# Patient Record
Sex: MALE | Race: WHITE | NOT HISPANIC OR LATINO | Employment: FULL TIME | URBAN - METROPOLITAN AREA
[De-identification: names, ages, dates, MRNs, and addresses within clinical notes are randomized per-mention and may not be internally consistent; named-entity substitution may affect disease eponyms.]

---

## 2018-07-05 ENCOUNTER — OFFICE VISIT (OUTPATIENT)
Dept: INTERNAL MEDICINE | Facility: CLINIC | Age: 28
End: 2018-07-05
Payer: COMMERCIAL

## 2018-07-05 VITALS
WEIGHT: 156.5 LBS | TEMPERATURE: 98 F | SYSTOLIC BLOOD PRESSURE: 110 MMHG | HEART RATE: 68 BPM | HEIGHT: 67 IN | OXYGEN SATURATION: 98 % | DIASTOLIC BLOOD PRESSURE: 62 MMHG | BODY MASS INDEX: 24.56 KG/M2

## 2018-07-05 DIAGNOSIS — M62.838 TRAPEZIUS MUSCLE SPASM: Primary | ICD-10-CM

## 2018-07-05 PROCEDURE — 99213 OFFICE O/P EST LOW 20 MIN: CPT | Mod: S$GLB,,, | Performed by: INTERNAL MEDICINE

## 2018-07-05 PROCEDURE — 99999 PR PBB SHADOW E&M-EST. PATIENT-LVL III: CPT | Mod: PBBFAC,,, | Performed by: INTERNAL MEDICINE

## 2018-07-05 RX ORDER — CYCLOBENZAPRINE HCL 10 MG
10 TABLET ORAL NIGHTLY
Qty: 30 TABLET | Refills: 1 | Status: SHIPPED | OUTPATIENT
Start: 2018-07-05 | End: 2018-08-04

## 2018-07-05 RX ORDER — TRAMADOL HYDROCHLORIDE 50 MG/1
50 TABLET ORAL EVERY 6 HOURS PRN
Qty: 30 TABLET | Refills: 0 | Status: SHIPPED | OUTPATIENT
Start: 2018-07-05 | End: 2018-07-15

## 2018-07-05 NOTE — PROGRESS NOTES
Subjective:       Patient ID: Dennis Franklin is a 28 y.o. male.    Chief Complaint: Neck Pain (upper back pain)    Had pain in right neck and upper back after his usual neck stretching maneuvers this am      Neck Pain    This is a new problem. The current episode started today. The problem occurs constantly. The problem has been unchanged. The pain is associated with nothing. The pain is present in the right side. The quality of the pain is described as aching and cramping. The pain is at a severity of 4/10. The pain is moderate. Pertinent negatives include no chest pain, fever, headaches, leg pain, numbness, pain with swallowing, paresis, photophobia, syncope, tingling, trouble swallowing, visual change, weakness or weight loss. He has tried heat and NSAIDs for the symptoms. The treatment provided mild relief.     Review of Systems   Constitutional: Negative for activity change, appetite change, fever and weight loss.   HENT: Negative for congestion, postnasal drip, sore throat and trouble swallowing.    Eyes: Negative for photophobia.   Respiratory: Negative for cough, shortness of breath and wheezing.    Cardiovascular: Negative for chest pain, palpitations and syncope.   Gastrointestinal: Negative for abdominal pain, blood in stool, constipation, diarrhea, nausea and vomiting.   Genitourinary: Negative for decreased urine volume, difficulty urinating, flank pain and frequency.   Musculoskeletal: Positive for neck pain. Negative for arthralgias.   Neurological: Negative for dizziness, tingling, weakness, numbness and headaches.       Objective:      Physical Exam   Musculoskeletal:        Arms:      Assessment:       1. Trapezius muscle spasm        Plan:   Dennis was seen today for neck pain.    Diagnoses and all orders for this visit:    Trapezius muscle spasm    Other orders  -     cyclobenzaprine (FLEXERIL) 10 MG tablet; Take 1 tablet (10 mg total) by mouth every evening.  -     traMADol (ULTRAM) 50 mg tablet; Take  1 tablet (50 mg total) by mouth every 6 (six) hours as needed for Pain.

## 2018-10-17 ENCOUNTER — LAB VISIT (OUTPATIENT)
Dept: LAB | Facility: HOSPITAL | Age: 28
End: 2018-10-17
Attending: INTERNAL MEDICINE
Payer: COMMERCIAL

## 2018-10-17 ENCOUNTER — OFFICE VISIT (OUTPATIENT)
Dept: INTERNAL MEDICINE | Facility: CLINIC | Age: 28
End: 2018-10-17
Payer: COMMERCIAL

## 2018-10-17 VITALS
HEIGHT: 67 IN | DIASTOLIC BLOOD PRESSURE: 78 MMHG | SYSTOLIC BLOOD PRESSURE: 132 MMHG | BODY MASS INDEX: 24.43 KG/M2 | OXYGEN SATURATION: 97 % | HEART RATE: 72 BPM | WEIGHT: 155.63 LBS

## 2018-10-17 DIAGNOSIS — Z00.00 ANNUAL PHYSICAL EXAM: ICD-10-CM

## 2018-10-17 DIAGNOSIS — Z11.4 SCREENING FOR HIV WITHOUT PRESENCE OF RISK FACTORS: ICD-10-CM

## 2018-10-17 DIAGNOSIS — Z13.220 ENCOUNTER FOR LIPID SCREENING FOR CARDIOVASCULAR DISEASE: ICD-10-CM

## 2018-10-17 DIAGNOSIS — Z13.6 ENCOUNTER FOR LIPID SCREENING FOR CARDIOVASCULAR DISEASE: ICD-10-CM

## 2018-10-17 DIAGNOSIS — Z23 NEED FOR IMMUNIZATION AGAINST INFLUENZA: ICD-10-CM

## 2018-10-17 DIAGNOSIS — N50.811 TESTICULAR PAIN, RIGHT: ICD-10-CM

## 2018-10-17 DIAGNOSIS — Z00.00 ANNUAL PHYSICAL EXAM: Primary | ICD-10-CM

## 2018-10-17 LAB
ALBUMIN SERPL BCP-MCNC: 4.3 G/DL
ALP SERPL-CCNC: 63 U/L
ALT SERPL W/O P-5'-P-CCNC: 36 U/L
ANION GAP SERPL CALC-SCNC: 9 MMOL/L
AST SERPL-CCNC: 19 U/L
BILIRUB SERPL-MCNC: 0.4 MG/DL
BUN SERPL-MCNC: 13 MG/DL
CALCIUM SERPL-MCNC: 10.4 MG/DL
CHLORIDE SERPL-SCNC: 102 MMOL/L
CHOLEST SERPL-MCNC: 174 MG/DL
CHOLEST/HDLC SERPL: 3.1 {RATIO}
CO2 SERPL-SCNC: 29 MMOL/L
CREAT SERPL-MCNC: 0.8 MG/DL
EST. GFR  (AFRICAN AMERICAN): >60 ML/MIN/1.73 M^2
EST. GFR  (NON AFRICAN AMERICAN): >60 ML/MIN/1.73 M^2
GLUCOSE SERPL-MCNC: 109 MG/DL
HDLC SERPL-MCNC: 56 MG/DL
HDLC SERPL: 32.2 %
LDLC SERPL CALC-MCNC: 100 MG/DL
NONHDLC SERPL-MCNC: 118 MG/DL
POTASSIUM SERPL-SCNC: 4.3 MMOL/L
PROT SERPL-MCNC: 7.4 G/DL
SODIUM SERPL-SCNC: 140 MMOL/L
TRIGL SERPL-MCNC: 90 MG/DL

## 2018-10-17 PROCEDURE — 90686 IIV4 VACC NO PRSV 0.5 ML IM: CPT | Mod: S$GLB,,, | Performed by: INTERNAL MEDICINE

## 2018-10-17 PROCEDURE — 99395 PREV VISIT EST AGE 18-39: CPT | Mod: 25,S$GLB,, | Performed by: INTERNAL MEDICINE

## 2018-10-17 PROCEDURE — 80053 COMPREHEN METABOLIC PANEL: CPT

## 2018-10-17 PROCEDURE — 90471 IMMUNIZATION ADMIN: CPT | Mod: S$GLB,,, | Performed by: INTERNAL MEDICINE

## 2018-10-17 PROCEDURE — 36415 COLL VENOUS BLD VENIPUNCTURE: CPT | Mod: PO

## 2018-10-17 PROCEDURE — 80061 LIPID PANEL: CPT

## 2018-10-17 PROCEDURE — 99999 PR PBB SHADOW E&M-EST. PATIENT-LVL IV: CPT | Mod: PBBFAC,,, | Performed by: INTERNAL MEDICINE

## 2018-10-17 PROCEDURE — 86703 HIV-1/HIV-2 1 RESULT ANTBDY: CPT

## 2018-10-18 LAB — HIV 1+2 AB+HIV1 P24 AG SERPL QL IA: NEGATIVE

## 2019-12-11 DIAGNOSIS — Z20.828 EXPOSURE TO THE FLU: Primary | ICD-10-CM

## 2019-12-11 RX ORDER — OSELTAMIVIR PHOSPHATE 75 MG/1
75 CAPSULE ORAL 2 TIMES DAILY
Qty: 10 CAPSULE | Refills: 0 | Status: SHIPPED | OUTPATIENT
Start: 2019-12-11 | End: 2019-12-16

## 2019-12-11 NOTE — PROGRESS NOTES
Spoke with wife today who has flu-like illness.  Shares a home with this patient.  Presumed flu contact.  Patient is interested to pursue Tamiflu as prophylaxis.

## 2020-08-15 ENCOUNTER — OFFICE VISIT (OUTPATIENT)
Dept: URGENT CARE | Facility: CLINIC | Age: 30
End: 2020-08-15
Payer: COMMERCIAL

## 2020-08-15 VITALS
DIASTOLIC BLOOD PRESSURE: 88 MMHG | RESPIRATION RATE: 17 BRPM | OXYGEN SATURATION: 98 % | TEMPERATURE: 98 F | SYSTOLIC BLOOD PRESSURE: 134 MMHG | HEIGHT: 67 IN | BODY MASS INDEX: 22.76 KG/M2 | HEART RATE: 66 BPM | WEIGHT: 145 LBS

## 2020-08-15 DIAGNOSIS — S90.31XA CONTUSION OF RIGHT FOOT, INITIAL ENCOUNTER: Primary | ICD-10-CM

## 2020-08-15 PROCEDURE — 99214 OFFICE O/P EST MOD 30 MIN: CPT | Mod: S$GLB,,, | Performed by: FAMILY MEDICINE

## 2020-08-15 PROCEDURE — 73630 XR FOOT COMPLETE 3 VIEW RIGHT: ICD-10-PCS | Mod: FY,RT,S$GLB, | Performed by: RADIOLOGY

## 2020-08-15 PROCEDURE — 99214 PR OFFICE/OUTPT VISIT, EST, LEVL IV, 30-39 MIN: ICD-10-PCS | Mod: S$GLB,,, | Performed by: FAMILY MEDICINE

## 2020-08-15 PROCEDURE — 73630 X-RAY EXAM OF FOOT: CPT | Mod: FY,RT,S$GLB, | Performed by: RADIOLOGY

## 2020-08-15 RX ORDER — ACETAMINOPHEN AND CODEINE PHOSPHATE 300; 30 MG/1; MG/1
TABLET ORAL
Qty: 20 TABLET | Refills: 0 | Status: SHIPPED | OUTPATIENT
Start: 2020-08-15 | End: 2022-12-22

## 2020-08-15 NOTE — PROGRESS NOTES
"Subjective:       Patient ID: Dennis Franklin is a 30 y.o. male.    Vitals:  height is 5' 7" (1.702 m) and weight is 65.8 kg (145 lb). His oral temperature is 98 °F (36.7 °C). His blood pressure is 134/88 and his pulse is 66. His respiration is 17 and oxygen saturation is 98%.     Chief Complaint: Foot Injury (right foot )    Patient says he was home remodeling bathroom , and he dropped a marble slab on his right foot . He now says he has numbness near foot and a few cuts . He also complains of pain with walking .  Injury occurred about an couple hours prior to arrival no previous history of any fracture    Foot Injury   The incident occurred 1 to 3 hours ago. The incident occurred at home. There was no injury mechanism. The pain is present in the right foot. The quality of the pain is described as aching. The pain is at a severity of 7/10. The pain is moderate. The pain has been constant since onset. Associated symptoms include numbness. He reports no foreign bodies present. Treatments tried: tramadol  The treatment provided mild relief.       Constitution: Negative for fatigue.   HENT: Negative for facial swelling and facial trauma.    Neck: Negative for neck stiffness.   Cardiovascular: Negative for chest trauma.   Eyes: Negative for eye trauma, double vision and blurred vision.   Gastrointestinal: Negative for abdominal trauma, abdominal pain and rectal bleeding.   Genitourinary: Negative for hematuria, genital trauma and pelvic pain.   Musculoskeletal: Positive for pain, trauma and pain with walking. Negative for joint swelling and abnormal ROM of joint.   Skin: Positive for color change and laceration. Negative for wound and abrasion.   Neurological: Positive for numbness. Negative for dizziness, history of vertigo, light-headedness, coordination disturbances, altered mental status and loss of consciousness.   Hematologic/Lymphatic: Negative for history of bleeding disorder.   Psychiatric/Behavioral: Negative for " altered mental status.       Objective:      Physical Exam   Constitutional: He is oriented to person, place, and time. He appears well-developed. He is cooperative.  Non-toxic appearance. He does not appear ill. No distress.   HENT:   Head: Normocephalic and atraumatic.   Ears:   Right Ear: Hearing, tympanic membrane, external ear and ear canal normal.   Left Ear: Hearing, tympanic membrane, external ear and ear canal normal.   Nose: Nose normal. No mucosal edema, rhinorrhea or nasal deformity. No epistaxis. Right sinus exhibits no maxillary sinus tenderness and no frontal sinus tenderness. Left sinus exhibits no maxillary sinus tenderness and no frontal sinus tenderness.   Mouth/Throat: Uvula is midline, oropharynx is clear and moist and mucous membranes are normal. No trismus in the jaw. Normal dentition. No uvula swelling. No posterior oropharyngeal erythema.   Eyes: Conjunctivae and lids are normal. Right eye exhibits no discharge. Left eye exhibits no discharge. No scleral icterus.   Neck: Trachea normal, normal range of motion, full passive range of motion without pain and phonation normal. Neck supple.   Cardiovascular: Normal rate, regular rhythm, normal heart sounds and normal pulses.   Pulmonary/Chest: Effort normal and breath sounds normal. No respiratory distress.   Abdominal: Soft. Normal appearance and bowel sounds are normal. He exhibits no distension, no pulsatile midline mass and no mass. There is no abdominal tenderness.   Musculoskeletal: Normal range of motion.         General: No deformity.      Comments: Right foot the right big toe with mild swelling and bruising noted under the nail also has small a small cuts on 2nd and 3rd toe and a mild swelling and moderate tenderness able to bear partial weight on   Neurological: He is alert and oriented to person, place, and time. He exhibits normal muscle tone. Coordination normal.   Skin: Skin is warm, dry, intact, not diaphoretic and not pale.  Psychiatric: His speech is normal and behavior is normal. Judgment and thought content normal.   Nursing note and vitals reviewed.        Assessment:       1. Contusion of right foot, initial encounter        Plan:         Contusion of right foot, initial encounter  -     Ambulatory referral/consult to Orthopedics  -     X-Ray Foot Complete Right; Future; Expected date: 08/15/2020    Other orders  -     acetaminophen-codeine 300-30mg (TYLENOL #3) 300-30 mg Tab; Take one po q 8 hrs prn severe pain.  Dispense: 20 tablet; Refill: 0          Apply ice    Marlo tape    Wound care instruction

## 2020-08-15 NOTE — PATIENT INSTRUCTIONS
Foot Contusion  You have a contusion. This is also called a bruise. There is swelling and some bleeding under the skin, but no broken bones. This injury generally takes a few days to a few weeks to heal.  During that time, the bruise will typically change in color from reddish, to purple-blue, to greenish-yellow, then to yellow-brown.  Home care  · Elevate the foot to reduce pain and swelling. As much as possible, sit or lie down with the foot raised about the level of your heart. This is especially important during the first 48 hours.  · Ice the foot to help reduce pain and swelling. Wrap a cold source (ice pack or ice cubes in a plastic bag) in a thin towel. Apply to the bruised area for 20 minutes every 1 to 2 hours the first day. Continue this 3 to 4 times a day until the pain and swelling goes away.  · Unless another medicine was prescribed, you can take acetaminophen, ibuprofen, or naproxen to control pain. (If you have chronic liver or kidney disease or ever had a stomach ulcer or gastrointestinal bleeding, talk with your healthcare provider before using these medicines.)  Follow up  Follow up with your healthcare provider or our staff as advised. Call if you are not improving within 1 to 2 weeks.  When to seek medical advice   Call your healthcare provider right away if you have any of the following:  · Increased pain or swelling  · Foot or leg becomes cold, blue, numb or tingly  · Signs of infection: Warmth, drainage, or increased redness or pain around the bruise  · Inability to move the injured foot   · Frequent bruising for unknown reasons  Date Last Reviewed: 2/1/2017  © 6247-1218 [employer]. [address] [place] [address], [place] [address]. All rights reserved. This information is not intended as a substitute for professional medical care. Always follow your healthcare professional's instructions.

## 2021-04-13 ENCOUNTER — PATIENT MESSAGE (OUTPATIENT)
Dept: RESEARCH | Facility: HOSPITAL | Age: 31
End: 2021-04-13

## 2021-07-21 ENCOUNTER — TELEPHONE (OUTPATIENT)
Dept: PODIATRY | Facility: CLINIC | Age: 31
End: 2021-07-21

## 2021-07-22 ENCOUNTER — OFFICE VISIT (OUTPATIENT)
Dept: PODIATRY | Facility: CLINIC | Age: 31
End: 2021-07-22
Payer: COMMERCIAL

## 2021-07-22 VITALS
DIASTOLIC BLOOD PRESSURE: 69 MMHG | HEIGHT: 67 IN | BODY MASS INDEX: 22.76 KG/M2 | HEART RATE: 69 BPM | SYSTOLIC BLOOD PRESSURE: 127 MMHG | WEIGHT: 145 LBS

## 2021-07-22 DIAGNOSIS — L03.031 PARONYCHIA, TOE, RIGHT: ICD-10-CM

## 2021-07-22 DIAGNOSIS — M79.674 TOE PAIN, RIGHT: ICD-10-CM

## 2021-07-22 DIAGNOSIS — L60.0 INGROWN NAIL: Primary | ICD-10-CM

## 2021-07-22 PROCEDURE — 99204 OFFICE O/P NEW MOD 45 MIN: CPT | Mod: S$GLB,,, | Performed by: PODIATRIST

## 2021-07-22 PROCEDURE — 87077 CULTURE AEROBIC IDENTIFY: CPT | Performed by: PODIATRIST

## 2021-07-22 PROCEDURE — 99999 PR PBB SHADOW E&M-EST. PATIENT-LVL III: ICD-10-PCS | Mod: PBBFAC,,, | Performed by: PODIATRIST

## 2021-07-22 PROCEDURE — 87075 CULTR BACTERIA EXCEPT BLOOD: CPT | Performed by: PODIATRIST

## 2021-07-22 PROCEDURE — 87186 SC STD MICRODIL/AGAR DIL: CPT | Performed by: PODIATRIST

## 2021-07-22 PROCEDURE — 87070 CULTURE OTHR SPECIMN AEROBIC: CPT | Performed by: PODIATRIST

## 2021-07-22 PROCEDURE — 99204 PR OFFICE/OUTPT VISIT, NEW, LEVL IV, 45-59 MIN: ICD-10-PCS | Mod: S$GLB,,, | Performed by: PODIATRIST

## 2021-07-22 PROCEDURE — 99999 PR PBB SHADOW E&M-EST. PATIENT-LVL III: CPT | Mod: PBBFAC,,, | Performed by: PODIATRIST

## 2021-07-22 RX ORDER — AMOXICILLIN AND CLAVULANATE POTASSIUM 500; 125 MG/1; MG/1
1 TABLET, FILM COATED ORAL 2 TIMES DAILY
Qty: 20 TABLET | Refills: 0 | Status: SHIPPED | OUTPATIENT
Start: 2021-07-22 | End: 2021-08-01

## 2021-07-22 RX ORDER — TOBRAMYCIN 3 MG/ML
SOLUTION/ DROPS OPHTHALMIC
Qty: 5 ML | Refills: 3 | Status: SHIPPED | OUTPATIENT
Start: 2021-07-22 | End: 2022-12-22

## 2021-07-24 LAB — BACTERIA SPEC AEROBE CULT: ABNORMAL

## 2021-07-26 LAB — BACTERIA SPEC ANAEROBE CULT: NORMAL

## 2022-01-11 ENCOUNTER — LAB VISIT (OUTPATIENT)
Dept: PRIMARY CARE CLINIC | Facility: CLINIC | Age: 32
End: 2022-01-11
Payer: COMMERCIAL

## 2022-01-11 DIAGNOSIS — Z20.822 CONTACT WITH AND (SUSPECTED) EXPOSURE TO COVID-19: ICD-10-CM

## 2022-01-11 LAB
CTP QC/QA: YES
SARS-COV-2 AG RESP QL IA.RAPID: POSITIVE

## 2022-01-11 PROCEDURE — 87811 SARS-COV-2 COVID19 W/OPTIC: CPT

## 2022-05-24 ENCOUNTER — OFFICE VISIT (OUTPATIENT)
Dept: PODIATRY | Facility: CLINIC | Age: 32
End: 2022-05-24
Payer: COMMERCIAL

## 2022-05-24 VITALS
HEART RATE: 81 BPM | BODY MASS INDEX: 22.76 KG/M2 | HEIGHT: 67 IN | WEIGHT: 145 LBS | DIASTOLIC BLOOD PRESSURE: 73 MMHG | SYSTOLIC BLOOD PRESSURE: 126 MMHG

## 2022-05-24 DIAGNOSIS — M79.674 TOE PAIN, RIGHT: ICD-10-CM

## 2022-05-24 DIAGNOSIS — L03.031 PARONYCHIA, TOE, RIGHT: ICD-10-CM

## 2022-05-24 DIAGNOSIS — L60.0 INGROWN NAIL: Primary | ICD-10-CM

## 2022-05-24 PROCEDURE — 99214 PR OFFICE/OUTPT VISIT, EST, LEVL IV, 30-39 MIN: ICD-10-PCS | Mod: S$GLB,,, | Performed by: PODIATRIST

## 2022-05-24 PROCEDURE — 99214 OFFICE O/P EST MOD 30 MIN: CPT | Mod: S$GLB,,, | Performed by: PODIATRIST

## 2022-05-24 PROCEDURE — 99999 PR PBB SHADOW E&M-EST. PATIENT-LVL III: CPT | Mod: PBBFAC,,, | Performed by: PODIATRIST

## 2022-05-24 PROCEDURE — 99999 PR PBB SHADOW E&M-EST. PATIENT-LVL III: ICD-10-PCS | Mod: PBBFAC,,, | Performed by: PODIATRIST

## 2022-05-24 RX ORDER — SULFAMETHOXAZOLE AND TRIMETHOPRIM 400; 80 MG/1; MG/1
1 TABLET ORAL 2 TIMES DAILY
Qty: 20 TABLET | Refills: 1 | Status: SHIPPED | OUTPATIENT
Start: 2022-05-24 | End: 2022-12-22

## 2022-05-24 NOTE — PROGRESS NOTES
Subjective:      Patient ID: Dennis Franklin is a 31 y.o. male.    Chief Complaint: Ingrown Toenail (R great toe)    Sharp throbbing pain right big toe/toenail.  Gradual onset, improved, not resolved over past week, aggravated by increased weight bearing, shoe gear, pressure.  Tobramycin tipical and oral augmentin helped..  OTC pain med not helping. Denies trauma, surgery bothfeet.    Cultures MRSA sensitive to bactrim.    Review of Systems   Constitutional: Negative for chills, diaphoresis, fever, malaise/fatigue and night sweats.   Cardiovascular: Negative for claudication, cyanosis, leg swelling and syncope.   Skin: Positive for nail changes. Negative for color change, dry skin, rash, suspicious lesions and unusual hair distribution.   Musculoskeletal: Negative for falls, joint pain, joint swelling, muscle cramps, muscle weakness and stiffness.   Gastrointestinal: Negative for constipation, diarrhea, nausea and vomiting.   Neurological: Negative for brief paralysis, disturbances in coordination, focal weakness, numbness, paresthesias, sensory change and tremors.           Objective:      Physical Exam  Constitutional:       General: He is not in acute distress.     Appearance: He is well-developed. He is not diaphoretic.   Cardiovascular:      Pulses:           Popliteal pulses are 2+ on the right side and 2+ on the left side.        Dorsalis pedis pulses are 2+ on the right side and 2+ on the left side.        Posterior tibial pulses are 2+ on the right side and 2+ on the left side.      Comments: Capillary refill 3 seconds all toes/distal feet, all toes/both feet warm to touch.      Negative lymphadenopathy bilateral popliteal fossa and tarsal tunnel.      Negavie lower extremity edema bilateral.    Musculoskeletal:      Right ankle: No swelling, deformity, ecchymosis or lacerations. Normal range of motion. Normal pulse.      Right Achilles Tendon: Normal. No defects. Selby's test negative.      Comments: Normal  angle, base, station of gait. All ten toes without clubbing, cyanosis, or signs of ischemia.  No pain to palpation bilateral lower extremities.  Range of motion, stability, muscle strength, and muscle tone normal bilateral feet and legs.    Lymphadenopathy:      Lower Body: No right inguinal adenopathy. No left inguinal adenopathy.      Comments: Negative lymphadenopathy bilateral popliteal fossa and tarsal tunnel.    Negative lymphangitic streaking bilateral feet/ankles/legs.   Skin:     General: Skin is warm and dry.      Capillary Refill: Capillary refill takes 2 to 3 seconds.      Coloration: Skin is not pale.      Findings: No abrasion, bruising, burn, ecchymosis, erythema, laceration, lesion or rash.      Nails: There is no clubbing.      Comments:   Visible and palpable ingrowth of toenail lateral border right hallux with pain to palpation, and focal localized erythema and edema,  without ulceration, drainage, pus, tracking, fluctuance, malodor, or cardinal signs infection.    Otherwise, Skin is normal age and health appropriate color, turgor, texture, and temperature bilateral lower extremities without ulceration, hyperpigmentation, discoloration, masses nodules or cords palpated.  No ecchymosis, erythema, edema, or cardinal signs of infection bilateral lower extremities.       Neurological:      Mental Status: He is alert and oriented to person, place, and time.      Sensory: No sensory deficit.      Motor: No tremor, atrophy or abnormal muscle tone.      Gait: Gait normal.      Deep Tendon Reflexes:      Reflex Scores:       Patellar reflexes are 2+ on the right side and 2+ on the left side.       Achilles reflexes are 2+ on the right side and 2+ on the left side.     Comments: Negative tinel sign to percussion sural, superficial peroneal, deep peroneal, saphenous, and posterior tibial nerves right and left ankles and feet.     Psychiatric:         Behavior: Behavior is cooperative.                Assessment:       Encounter Diagnoses   Name Primary?    Ingrown nail Yes    Paronychia, toe, right     Toe pain, right          Plan:       Dennis was seen today for ingrown toenail.    Diagnoses and all orders for this visit:    Ingrown nail    Paronychia, toe, right    Toe pain, right    Other orders  -     sulfamethoxazole-trimethoprim 400-80mg (BACTRIM,SEPTRA) 400-80 mg per tablet; Take 1 tablet by mouth 2 (two) times daily.      I counseled the patient on his conditions, their implications and medical management.        Continue daily dressings all times.    Discussed conservative treatment with shoes of adequate dimensions, material, and style to alleviate symptoms and delay or prevent surgical intervention.    Bactrim.        Follow up in about 2 weeks (around 6/7/2022) for ng nail paronychia foollow up..

## 2022-06-16 ENCOUNTER — OFFICE VISIT (OUTPATIENT)
Dept: PODIATRY | Facility: CLINIC | Age: 32
End: 2022-06-16
Payer: COMMERCIAL

## 2022-06-16 VITALS
WEIGHT: 145 LBS | HEIGHT: 67 IN | HEART RATE: 76 BPM | DIASTOLIC BLOOD PRESSURE: 74 MMHG | BODY MASS INDEX: 22.76 KG/M2 | SYSTOLIC BLOOD PRESSURE: 124 MMHG

## 2022-06-16 DIAGNOSIS — L60.0 INGROWN NAIL: Primary | ICD-10-CM

## 2022-06-16 DIAGNOSIS — M79.674 TOE PAIN, RIGHT: ICD-10-CM

## 2022-06-16 DIAGNOSIS — L03.031 PARONYCHIA, TOE, RIGHT: ICD-10-CM

## 2022-06-16 PROCEDURE — 99213 OFFICE O/P EST LOW 20 MIN: CPT | Mod: S$GLB,,, | Performed by: PODIATRIST

## 2022-06-16 PROCEDURE — 99213 PR OFFICE/OUTPT VISIT, EST, LEVL III, 20-29 MIN: ICD-10-PCS | Mod: S$GLB,,, | Performed by: PODIATRIST

## 2022-06-16 PROCEDURE — 99999 PR PBB SHADOW E&M-EST. PATIENT-LVL III: ICD-10-PCS | Mod: PBBFAC,,, | Performed by: PODIATRIST

## 2022-06-16 PROCEDURE — 99999 PR PBB SHADOW E&M-EST. PATIENT-LVL III: CPT | Mod: PBBFAC,,, | Performed by: PODIATRIST

## 2022-06-16 NOTE — PROGRESS NOTES
Subjective:      Patient ID: Dennis Franklin is a 31 y.o. male.    Chief Complaint: Follow-up (R ingrown toenail)    Sharp throbbing pain right big toe/toenail.  Gradual onset, improved, not resolved over past week, aggravated by increased weight bearing, shoe gear, pressure.  Tobramycin tipical and oral augmentin/Bactrim helped..  OTC pain med not helping. Denies trauma, surgery bothfeet.    Finish Bactrim.  Still has residual pain.  Particularly with socks and shoes activity..    Review of Systems   Constitutional: Negative for chills, diaphoresis, fever, malaise/fatigue and night sweats.   Cardiovascular: Negative for claudication, cyanosis, leg swelling and syncope.   Skin: Positive for nail changes. Negative for color change, dry skin, rash, suspicious lesions and unusual hair distribution.   Musculoskeletal: Negative for falls, joint pain, joint swelling, muscle cramps, muscle weakness and stiffness.   Gastrointestinal: Negative for constipation, diarrhea, nausea and vomiting.   Neurological: Negative for brief paralysis, disturbances in coordination, focal weakness, numbness, paresthesias, sensory change and tremors.           Objective:      Physical Exam  Constitutional:       General: He is not in acute distress.     Appearance: He is well-developed. He is not diaphoretic.   Cardiovascular:      Pulses:           Popliteal pulses are 2+ on the right side and 2+ on the left side.        Dorsalis pedis pulses are 2+ on the right side and 2+ on the left side.        Posterior tibial pulses are 2+ on the right side and 2+ on the left side.      Comments: Capillary refill 3 seconds all toes/distal feet, all toes/both feet warm to touch.      Negative lymphadenopathy bilateral popliteal fossa and tarsal tunnel.      Negavie lower extremity edema bilateral.    Musculoskeletal:      Right ankle: No swelling, deformity, ecchymosis or lacerations. Normal range of motion. Normal pulse.      Right Achilles Tendon: Normal.  No defects. Selby's test negative.      Comments: Normal angle, base, station of gait. All ten toes without clubbing, cyanosis, or signs of ischemia.  No pain to palpation bilateral lower extremities.  Range of motion, stability, muscle strength, and muscle tone normal bilateral feet and legs.    Lymphadenopathy:      Lower Body: No right inguinal adenopathy. No left inguinal adenopathy.      Comments: Negative lymphadenopathy bilateral popliteal fossa and tarsal tunnel.    Negative lymphangitic streaking bilateral feet/ankles/legs.   Skin:     General: Skin is warm and dry.      Capillary Refill: Capillary refill takes 2 to 3 seconds.      Coloration: Skin is not pale.      Findings: No abrasion, bruising, burn, ecchymosis, erythema, laceration, lesion or rash.      Nails: There is no clubbing.      Comments:   Visible and palpable ingrowth of toenail lateral border right hallux with pain to palpation, and focal localized erythema and edema,  without ulceration, drainage, pus, tracking, fluctuance, malodor, or cardinal signs infection.    Otherwise, Skin is normal age and health appropriate color, turgor, texture, and temperature bilateral lower extremities without ulceration, hyperpigmentation, discoloration, masses nodules or cords palpated.  No ecchymosis, erythema, edema, or cardinal signs of infection bilateral lower extremities.       Neurological:      Mental Status: He is alert and oriented to person, place, and time.      Sensory: No sensory deficit.      Motor: No tremor, atrophy or abnormal muscle tone.      Gait: Gait normal.      Deep Tendon Reflexes:      Reflex Scores:       Patellar reflexes are 2+ on the right side and 2+ on the left side.       Achilles reflexes are 2+ on the right side and 2+ on the left side.     Comments: Negative tinel sign to percussion sural, superficial peroneal, deep peroneal, saphenous, and posterior tibial nerves right and left ankles and feet.     Psychiatric:          Behavior: Behavior is cooperative.               Assessment:       Encounter Diagnoses   Name Primary?    Ingrown nail Yes    Paronychia, toe, right     Toe pain, right          Plan:       Dennis was seen today for follow-up.    Diagnoses and all orders for this visit:    Ingrown nail    Paronychia, toe, right    Toe pain, right      I counseled the patient on his conditions, their implications and medical management.    Continue daily dressings all times right hallux.    Discussed conservative treatment with shoes of adequate dimensions, material, and style to alleviate symptoms and delay or prevent surgical intervention.    Matricectomy one-week lateral right hallux peer.        Follow up in about 1 week (around 6/23/2022) for matrixectomy lateral right hallux.

## 2022-06-23 ENCOUNTER — OFFICE VISIT (OUTPATIENT)
Dept: PODIATRY | Facility: CLINIC | Age: 32
End: 2022-06-23
Payer: COMMERCIAL

## 2022-06-23 VITALS
WEIGHT: 145 LBS | SYSTOLIC BLOOD PRESSURE: 118 MMHG | BODY MASS INDEX: 22.76 KG/M2 | HEIGHT: 67 IN | DIASTOLIC BLOOD PRESSURE: 69 MMHG | HEART RATE: 68 BPM

## 2022-06-23 DIAGNOSIS — M79.674 TOE PAIN, RIGHT: ICD-10-CM

## 2022-06-23 DIAGNOSIS — L60.0 INGROWN NAIL: Primary | ICD-10-CM

## 2022-06-23 DIAGNOSIS — L03.031 PARONYCHIA, TOE, RIGHT: ICD-10-CM

## 2022-06-23 PROCEDURE — 11750 EXCISION NAIL&NAIL MATRIX: CPT | Mod: T5,S$GLB,, | Performed by: PODIATRIST

## 2022-06-23 PROCEDURE — 99499 UNLISTED E&M SERVICE: CPT | Mod: S$GLB,,, | Performed by: PODIATRIST

## 2022-06-23 PROCEDURE — 11750 NAIL REMOVAL: ICD-10-PCS | Mod: T5,S$GLB,, | Performed by: PODIATRIST

## 2022-06-23 PROCEDURE — 99499 NO LOS: ICD-10-PCS | Mod: S$GLB,,, | Performed by: PODIATRIST

## 2022-06-23 PROCEDURE — 99999 PR PBB SHADOW E&M-EST. PATIENT-LVL III: ICD-10-PCS | Mod: PBBFAC,,, | Performed by: PODIATRIST

## 2022-06-23 PROCEDURE — 99999 PR PBB SHADOW E&M-EST. PATIENT-LVL III: CPT | Mod: PBBFAC,,, | Performed by: PODIATRIST

## 2022-06-23 NOTE — PROGRESS NOTES
matricectomy lateral right hallux.      Cover right hallux all times with Band-Aid or similar changing daily.      Topical tobramycin drops twice daily.      Discussed conservative treatment with shoes of adequate dimensions, material, and style to alleviate symptoms and delay or prevent surgical intervention.    2 weeks

## 2022-06-23 NOTE — PROCEDURES
Nail Removal    Date/Time: 6/23/2022 7:22 AM  Performed by: Telly Reynolds DPM  Authorized by: Telly Reynolds DPM     Consent Done?:  Yes (Written)  Time out: Immediately prior to the procedure a time out was called    Location:     Location:  Right foot  Anesthesia:     Anesthesia:  Digital block    Local anesthetic:  Lidocaine 2% without epinephrine    Anesthetic total (ml):  4  Procedure Details:     Preparation:  Skin prepped with alcohol    Amount removed:  Partial    Side:  Lateral    Wedge excision of skin of nail fold: No      Nail bed sutured?: No      Nail matrix removed:  Partial    Removal method:  Phenol and alcohol    Removed nail replaced and anchored: No      Dressing applied:  Dressing applied    Patient tolerance:  Patient tolerated the procedure well with no immediate complications

## 2022-12-20 ENCOUNTER — TELEPHONE (OUTPATIENT)
Dept: PODIATRY | Facility: CLINIC | Age: 32
End: 2022-12-20
Payer: COMMERCIAL

## 2022-12-20 NOTE — TELEPHONE ENCOUNTER
Spoke with pt in regards to message in portal. Pt was advise that Dr. Greco was completely booked up until Jan 23. Pt wanted to be seen as soon as possible and pt stated that she wanted something close to home. Pt agreed to see Dr. Fitzgerald on Thursday. No further concerns was expressed. Called ended. ----- Message from Nedra Fulton sent at 12/20/2022  3:02 PM CST -----  Type:  Needs Medical Advice    Who Called: self  Reason:returning call  Would the patient rather a call back or a response via [facility]?call  Best Call Back Number: [phone]   Additional Information: none

## 2022-12-20 NOTE — TELEPHONE ENCOUNTER
Called pt in regards to message in portal. Pt didn't answer. Leave VM asking for a call back. ----- Message from Meka Navas sent at 12/20/2022 12:52 PM CST -----  Needs advice from nurse:      Who Called:pt  Regarding:patient needs appt for infected ingrown toenail  Would the patient rather a call back or VIA [facility]?  Best Call Back number:[phone]  Additional Info:

## 2022-12-22 ENCOUNTER — OFFICE VISIT (OUTPATIENT)
Dept: PODIATRY | Facility: CLINIC | Age: 32
End: 2022-12-22
Payer: COMMERCIAL

## 2022-12-22 VITALS
SYSTOLIC BLOOD PRESSURE: 110 MMHG | WEIGHT: 145 LBS | DIASTOLIC BLOOD PRESSURE: 60 MMHG | HEIGHT: 67 IN | BODY MASS INDEX: 22.76 KG/M2

## 2022-12-22 DIAGNOSIS — L60.0 INGROWN NAIL: ICD-10-CM

## 2022-12-22 DIAGNOSIS — M79.674 TOE PAIN, RIGHT: Primary | ICD-10-CM

## 2022-12-22 PROCEDURE — 99499 NO LOS: ICD-10-PCS | Mod: S$GLB,,, | Performed by: PODIATRIST

## 2022-12-22 PROCEDURE — 11750 NAIL REMOVAL: ICD-10-PCS | Mod: T5,S$GLB,, | Performed by: PODIATRIST

## 2022-12-22 PROCEDURE — 11750 EXCISION NAIL&NAIL MATRIX: CPT | Mod: T5,S$GLB,, | Performed by: PODIATRIST

## 2022-12-22 PROCEDURE — 99499 UNLISTED E&M SERVICE: CPT | Mod: S$GLB,,, | Performed by: PODIATRIST

## 2022-12-22 PROCEDURE — 99999 PR PBB SHADOW E&M-EST. PATIENT-LVL III: CPT | Mod: PBBFAC,,, | Performed by: PODIATRIST

## 2022-12-22 PROCEDURE — 99999 PR PBB SHADOW E&M-EST. PATIENT-LVL III: ICD-10-PCS | Mod: PBBFAC,,, | Performed by: PODIATRIST

## 2022-12-22 NOTE — PATIENT INSTRUCTIONS
"AFTER TOENAIL PROCEDURE INSTRUCTIONS    1. Leave bandage intact until you shower (12-24 hours). If the bandage sticks as you try to remove it, soak it in warm water until it lifts off.    2. Dry foot completely after showering, and apply a small amount of triple antibiotic ointment (Neosporin works fine!) and a fabric or cloth bandaid ("plastic" bandaids tend to lift off with ointment use).  Wear open-toed shoes as needed for comfort.     3. Take Advil or Tylenol as needed for pain.     4. Your toe may drain for the next few days. Normal drainage is yellow-to-pink, and clear, much like the fluid in a blister. Watch for redness spreading up your toe into your foot, white thick drainage (pus), pain unrelieved by medication, or nausea/vomiting/fever/chills. These are signs of infection. Please call the clinic or visit your doctor.    5. You may stop dressing toe once it stops draining (about 3 - 7 days).   "

## 2023-01-05 ENCOUNTER — OFFICE VISIT (OUTPATIENT)
Dept: PODIATRY | Facility: CLINIC | Age: 33
End: 2023-01-05
Payer: COMMERCIAL

## 2023-01-05 VITALS
HEART RATE: 76 BPM | BODY MASS INDEX: 22.76 KG/M2 | WEIGHT: 145 LBS | HEIGHT: 67 IN | SYSTOLIC BLOOD PRESSURE: 117 MMHG | DIASTOLIC BLOOD PRESSURE: 78 MMHG

## 2023-01-05 DIAGNOSIS — Z09 FOLLOW-UP EXAM AFTER TREATMENT: ICD-10-CM

## 2023-01-05 DIAGNOSIS — L60.0 INGROWN NAIL: Primary | ICD-10-CM

## 2023-01-05 PROCEDURE — 99024 PR POST-OP FOLLOW-UP VISIT: ICD-10-PCS | Mod: S$GLB,,, | Performed by: PODIATRIST

## 2023-01-05 PROCEDURE — 99999 PR PBB SHADOW E&M-EST. PATIENT-LVL III: ICD-10-PCS | Mod: PBBFAC,,, | Performed by: PODIATRIST

## 2023-01-05 PROCEDURE — 99999 PR PBB SHADOW E&M-EST. PATIENT-LVL III: CPT | Mod: PBBFAC,,, | Performed by: PODIATRIST

## 2023-01-05 PROCEDURE — 99024 POSTOP FOLLOW-UP VISIT: CPT | Mod: S$GLB,,, | Performed by: PODIATRIST

## 2023-01-05 NOTE — PROGRESS NOTES
"  Chief Complaint   Patient presents with    Post-op Evaluation     Right great toenail         S:   32 y.o. male returns for follow up s/p ingrown toenail procedure - right hallux.   Patient is healing well, no complaints.  The patient has been keeping the toe covered as instructed.   Patient has no pain today. Patient denies constitutional symptoms.         O:   Vitals:    01/05/23 1147   BP: 117/78   Pulse: 76   Weight: 65.8 kg (145 lb)   Height: 5' 7" (1.702 m)        S/p  right  hallux ingrown toenail matrixectomy. Minimal  erythema;  no ascending cellulitis.   no swelling. No drainage.    The site is healing well. No signs of infection.   Pedal pulses are palpable.   Range of motion intact.   no  tenderness to palpation.             A/P:     1. Ingrown nail        2. Follow-up exam after treatment             S/p right hallux ingrown toenail chemical matrixectomy.  Patient is healing well.      Return to regular activities as tolerated.     Call or return to clinic prn if these symptoms worsen or fail to improve as anticipated.     "

## 2023-05-04 ENCOUNTER — OFFICE VISIT (OUTPATIENT)
Dept: PODIATRY | Facility: CLINIC | Age: 33
End: 2023-05-04
Payer: COMMERCIAL

## 2023-05-04 VITALS
BODY MASS INDEX: 22.76 KG/M2 | WEIGHT: 145 LBS | DIASTOLIC BLOOD PRESSURE: 68 MMHG | HEART RATE: 61 BPM | SYSTOLIC BLOOD PRESSURE: 123 MMHG | HEIGHT: 67 IN

## 2023-05-04 DIAGNOSIS — Z09 FOLLOW-UP EXAM AFTER TREATMENT: ICD-10-CM

## 2023-05-04 DIAGNOSIS — L60.0 INGROWN NAIL: ICD-10-CM

## 2023-05-04 DIAGNOSIS — M79.674 TOE PAIN, RIGHT: Primary | ICD-10-CM

## 2023-05-04 PROCEDURE — 99212 PR OFFICE/OUTPT VISIT, EST, LEVL II, 10-19 MIN: ICD-10-PCS | Mod: S$GLB,,, | Performed by: PODIATRIST

## 2023-05-04 PROCEDURE — 99999 PR PBB SHADOW E&M-EST. PATIENT-LVL III: ICD-10-PCS | Mod: PBBFAC,,, | Performed by: PODIATRIST

## 2023-05-04 PROCEDURE — 99999 PR PBB SHADOW E&M-EST. PATIENT-LVL III: CPT | Mod: PBBFAC,,, | Performed by: PODIATRIST

## 2023-05-04 PROCEDURE — 99212 OFFICE O/P EST SF 10 MIN: CPT | Mod: S$GLB,,, | Performed by: PODIATRIST

## 2023-05-04 RX ORDER — COVID-19 ANTIGEN TEST
KIT MISCELLANEOUS
COMMUNITY
Start: 2023-04-12

## 2023-09-18 ENCOUNTER — OFFICE VISIT (OUTPATIENT)
Dept: PODIATRY | Facility: CLINIC | Age: 33
End: 2023-09-18
Payer: COMMERCIAL

## 2023-09-18 ENCOUNTER — TELEPHONE (OUTPATIENT)
Dept: PODIATRY | Facility: CLINIC | Age: 33
End: 2023-09-18
Payer: COMMERCIAL

## 2023-09-18 VITALS
SYSTOLIC BLOOD PRESSURE: 128 MMHG | HEART RATE: 64 BPM | BODY MASS INDEX: 22.76 KG/M2 | WEIGHT: 145 LBS | DIASTOLIC BLOOD PRESSURE: 77 MMHG | HEIGHT: 67 IN

## 2023-09-18 DIAGNOSIS — M79.674 TOE PAIN, RIGHT: Primary | ICD-10-CM

## 2023-09-18 DIAGNOSIS — L03.031 PARONYCHIA, TOE, RIGHT: ICD-10-CM

## 2023-09-18 PROCEDURE — 99213 OFFICE O/P EST LOW 20 MIN: CPT | Mod: S$GLB,,, | Performed by: PODIATRIST

## 2023-09-18 PROCEDURE — 99213 PR OFFICE/OUTPT VISIT, EST, LEVL III, 20-29 MIN: ICD-10-PCS | Mod: S$GLB,,, | Performed by: PODIATRIST

## 2023-09-18 PROCEDURE — 99999 PR PBB SHADOW E&M-EST. PATIENT-LVL III: CPT | Mod: PBBFAC,,, | Performed by: PODIATRIST

## 2023-09-18 PROCEDURE — 99999 PR PBB SHADOW E&M-EST. PATIENT-LVL III: ICD-10-PCS | Mod: PBBFAC,,, | Performed by: PODIATRIST

## 2023-09-18 RX ORDER — OMEPRAZOLE 40 MG/1
40 CAPSULE, DELAYED RELEASE ORAL 2 TIMES DAILY
COMMUNITY
Start: 2023-05-24

## 2023-09-18 RX ORDER — CEPHALEXIN 500 MG/1
500 CAPSULE ORAL EVERY 12 HOURS
Qty: 10 CAPSULE | Refills: 0 | Status: SHIPPED | OUTPATIENT
Start: 2023-09-18 | End: 2023-09-23

## 2023-09-18 RX ORDER — NEOMYCIN SULFATE, POLYMYXIN B SULFATE AND HYDROCORTISONE 10; 3.5; 1 MG/ML; MG/ML; [USP'U]/ML
2 SUSPENSION/ DROPS AURICULAR (OTIC) 2 TIMES DAILY
Qty: 10 ML | Refills: 1 | Status: SHIPPED | OUTPATIENT
Start: 2023-09-18

## 2023-09-18 RX ORDER — CEFDINIR 300 MG/1
300 CAPSULE ORAL EVERY 12 HOURS
COMMUNITY
Start: 2023-05-24 | End: 2023-09-18

## 2023-09-18 NOTE — PROGRESS NOTES
"  CHIEF CONCERN: Ingrown Toenail             HPI:    Dennis Franklin is a 33 y.o. male with concerns of painful toenail on the right hallux, lateral border.  Had chemical matrixectomy a few months ago.    Noted pain a few weeks ago.  Tried to trim at home.  He has been working out more but no changes in shoes.         There is no problem list on file for this patient.          Current Outpatient Medications on File Prior to Visit   Medication Sig Dispense Refill    omeprazole (PRILOSEC) 40 MG capsule Take 40 mg by mouth 2 (two) times daily.      [DISCONTINUED] cefdinir (OMNICEF) 300 MG capsule Take 300 mg by mouth every 12 (twelve) hours.      COVID-19 AT-HOME TEST Kit FOLLOW INSTRUCTIONS INCLUDED WITH THE PACKAGE.       No current facility-administered medications on file prior to visit.            Review of patient's allergies indicates:  No Known Allergies        ROS:  General ROS: negative for chills, fatigue or fever  Cardiovascular ROS: no chest pain or dyspnea on exertion  Musculoskeletal ROS: negative for - joint pain or joint stiffness.  Negative for loss of strength  Neuro ROS: Negative for syncope, numbness, or muscle weakness  Skin ROS: Negative for rash, itching or hair changes.  +Toenail changes        EXAM:   Vitals:    09/18/23 1144   BP: 128/77   Pulse: 64   Weight: 65.8 kg (145 lb)   Height: 5' 7" (1.702 m)       Right LOWER EXTREMITY EXAM:    Vascular:    Dorsalis pedis and posterior tibial pulses are palpable. capillary refill time is within normal limits   Toes are warm to touch.    There is  presence of digital hair.    There is  moderate localized edema to the affected toe.     Neurological:    Light touch, proprioception, and sharp/dull sensation are all intact.   Mulders click:  Absent  Tinels:  Absent.   No LOPS    Dermatological:    moderate erythema noted to the affected area, lateral border.  Scant drainage.   Absent paronychia.     Absent abscess      Musculoskeletal:    Muscle strength is " 5/5 in all groups .    No swelling/crepitus at the interphalangeal joints.  non palpable pain 1st and 2nd PIPJ right   non palpable pain 1st and 2nd DIPJ right                ASSESSMENT/PLAN     Problem List Items Addressed This Visit    None  Visit Diagnoses       Toe pain, right    -  Primary    Relevant Medications    neomycin-polymyxin-hydrocortisone (CORTISPORIN) 3.5-10,000-1 mg/mL-unit/mL-% otic suspension    cephALEXin (KEFLEX) 500 MG capsule    Paronychia, toe, right        Relevant Medications    neomycin-polymyxin-hydrocortisone (CORTISPORIN) 3.5-10,000-1 mg/mL-unit/mL-% otic suspension    cephALEXin (KEFLEX) 500 MG capsule              I counseled the patient on the patient's  conditions, their implications and medical management.     Prescription: see above.     General nail care measures for abnormal nails include:  Keeping nails trimmed short, but avoid overzealous trimming  Avoiding trauma   Avoiding contact irritants   Keeping nails dry (avoiding wet work)  Avoiding all nail cosmetics  Wearing shoes that fit well at the toe box.  Avoid tight shoes.       Follow up if no improvement as expected.           Asiya Fitzgerald DPM

## 2023-09-18 NOTE — TELEPHONE ENCOUNTER
Staff contacted and spoke to  [facility] with [facility] regarding instruction for patient medication neomycin-polymyxin-hydrocortisone (CORTISPORIN), wanting to know should the patient put the drops on his toe or ear.    Staff informed [facility] with [facility] the patient should apply the medication to his toe 2 drop twice a daily.      [facility] with [facility] verbalized understanding.

## 2023-09-18 NOTE — TELEPHONE ENCOUNTER
----- Message from Janet Gordon sent at 9/18/2023 12:34 PM CDT -----  Regarding: Medication clarification  Contact: Jacques @ [phone]  Jacques with [facility] is calling in needing verbal clarification instructions on the pts neomycin-polymyxin-hydrocortisone (CORTISPORIN) 3.5-10,000-1 mg/mL-unit/mL-% otic suspension. Please call the pharmacy to discuss further

## 2023-12-01 ENCOUNTER — OFFICE VISIT (OUTPATIENT)
Dept: PODIATRY | Facility: CLINIC | Age: 33
End: 2023-12-01
Payer: COMMERCIAL

## 2023-12-01 VITALS
BODY MASS INDEX: 24.42 KG/M2 | HEIGHT: 67 IN | DIASTOLIC BLOOD PRESSURE: 74 MMHG | WEIGHT: 155.56 LBS | HEART RATE: 77 BPM | SYSTOLIC BLOOD PRESSURE: 126 MMHG

## 2023-12-01 DIAGNOSIS — M79.674 TOE PAIN, RIGHT: Primary | ICD-10-CM

## 2023-12-01 DIAGNOSIS — L60.0 INGROWN NAIL: ICD-10-CM

## 2023-12-01 PROCEDURE — 99213 OFFICE O/P EST LOW 20 MIN: CPT | Mod: S$GLB,,, | Performed by: PODIATRIST

## 2023-12-01 PROCEDURE — 99999 PR PBB SHADOW E&M-EST. PATIENT-LVL III: ICD-10-PCS | Mod: PBBFAC,,, | Performed by: PODIATRIST

## 2023-12-01 PROCEDURE — 99213 PR OFFICE/OUTPT VISIT, EST, LEVL III, 20-29 MIN: ICD-10-PCS | Mod: S$GLB,,, | Performed by: PODIATRIST

## 2023-12-01 PROCEDURE — 99999 PR PBB SHADOW E&M-EST. PATIENT-LVL III: CPT | Mod: PBBFAC,,, | Performed by: PODIATRIST

## 2023-12-01 NOTE — PROGRESS NOTES
"  CHIEF CONCERN: Ingrown Toenail (Right big toe ingrown toenail)             HPI:    Dennis Franklin is a 33 y.o. male with concerns of painful toenail on the right hallux, lateral border.  Had chemical matrixectomy in the past.  Dry skin noted to the edge .  Tried to trim at home.  Noted pain exacerbated with a certain pair of shoes.         There is no problem list on file for this patient.          Current Outpatient Medications on File Prior to Visit   Medication Sig Dispense Refill    COVID-19 AT-HOME TEST Kit FOLLOW INSTRUCTIONS INCLUDED WITH THE PACKAGE.      neomycin-polymyxin-hydrocortisone (CORTISPORIN) 3.5-10,000-1 mg/mL-unit/mL-% otic suspension Place 2 drops into the right ear 2 (two) times daily. Apply to the affected toe, not the ear. 10 mL 1    omeprazole (PRILOSEC) 40 MG capsule Take 40 mg by mouth 2 (two) times daily.       No current facility-administered medications on file prior to visit.            Review of patient's allergies indicates:  No Known Allergies        ROS:  General ROS: negative for chills, fatigue or fever  Cardiovascular ROS: no chest pain or dyspnea on exertion  Musculoskeletal ROS: negative for - joint pain or joint stiffness.  Negative for loss of strength  Neuro ROS: Negative for syncope, numbness, or muscle weakness  Skin ROS: Negative for rash, itching or hair changes.  +Toenail changes        EXAM:   Vitals:    12/01/23 0851   BP: 126/74   Pulse: 77   Weight: 70.5 kg (155 lb 8.6 oz)   Height: 5' 7" (1.702 m)       Right LOWER EXTREMITY EXAM:    Vascular:    Dorsalis pedis and posterior tibial pulses are palpable. capillary refill time is within normal limits   Toes are warm to touch.    There is  presence of digital hair.    There is  minimal localized edema to the affected toe.     Neurological:    Light touch, proprioception, and sharp/dull sensation are all intact.   Mulders click:  Absent  Tinels:  Absent.   No LOPS    Dermatological:    minimal erythema noted to the " affected area, lateral border.  No drainage.  Small hyperkeratosis at the edge.   Absent paronychia.     Absent abscess      Musculoskeletal:    Muscle strength is 5/5 in all groups .    No swelling/crepitus at the interphalangeal joints.  non palpable pain 1st and 2nd PIPJ right   non palpable pain 1st and 2nd DIPJ right                ASSESSMENT/PLAN     Problem List Items Addressed This Visit    None  Visit Diagnoses       Toe pain, right    -  Primary    Ingrown nail                    I counseled the patient on the patient's  conditions, their implications and medical management.     Continue cortisporin which he already has at home.    Border trimmed  topical antibiotic and bandaid.  Continue to monitor.     General nail care measures for abnormal nails include:  Keeping nails trimmed short, but avoid overzealous trimming  Avoiding trauma   Avoiding contact irritants   Keeping nails dry (avoiding wet work)  Avoiding all nail cosmetics  Wearing shoes that fit well at the toe box.  Avoid tight shoes.       Follow up if no improvement as expected.       I spent a total of 20 minutes on the day of the visit.  This includes face to face time and non-face to face time preparing to see the patient (eg, review of tests), obtaining and/or reviewing separately obtained history, documenting clinical information in the electronic or other health record, independently interpreting results and communicating results to the patient/family/caregiver, or care coordinator.

## 2024-02-23 ENCOUNTER — TELEPHONE (OUTPATIENT)
Dept: ORTHOPEDICS | Facility: CLINIC | Age: 34
End: 2024-02-23
Payer: COMMERCIAL

## 2024-02-23 DIAGNOSIS — M79.645 PAIN OF LEFT THUMB: Primary | ICD-10-CM

## 2024-02-26 ENCOUNTER — OFFICE VISIT (OUTPATIENT)
Dept: ORTHOPEDICS | Facility: CLINIC | Age: 34
End: 2024-02-26
Payer: COMMERCIAL

## 2024-02-26 ENCOUNTER — HOSPITAL ENCOUNTER (OUTPATIENT)
Dept: RADIOLOGY | Facility: HOSPITAL | Age: 34
Discharge: HOME | End: 2024-02-26
Attending: ORTHOPAEDIC SURGERY
Payer: COMMERCIAL

## 2024-02-26 VITALS — WEIGHT: 155 LBS | BODY MASS INDEX: 24.33 KG/M2 | HEIGHT: 67 IN

## 2024-02-26 DIAGNOSIS — M79.645 PAIN OF LEFT THUMB: ICD-10-CM

## 2024-02-26 DIAGNOSIS — M77.8 THUMB TENDONITIS: Primary | ICD-10-CM

## 2024-02-26 PROCEDURE — 73130 X-RAY EXAM OF HAND: CPT | Mod: TC,FY,LT

## 2024-02-26 PROCEDURE — 99203 OFFICE O/P NEW LOW 30 MIN: CPT | Mod: S$GLB,,, | Performed by: ORTHOPAEDIC SURGERY

## 2024-02-26 PROCEDURE — 99999 PR PBB SHADOW E&M-EST. PATIENT-LVL III: CPT | Mod: PBBFAC,,, | Performed by: ORTHOPAEDIC SURGERY

## 2024-02-26 PROCEDURE — 73130 X-RAY EXAM OF HAND: CPT | Mod: 26,LT,, | Performed by: STUDENT IN AN ORGANIZED HEALTH CARE EDUCATION/TRAINING PROGRAM

## 2024-02-26 RX ORDER — CELECOXIB 200 MG/1
200 CAPSULE ORAL DAILY
Qty: 30 CAPSULE | Refills: 1 | Status: SHIPPED | OUTPATIENT
Start: 2024-02-26 | End: 2024-04-26

## 2024-02-26 RX ORDER — DICLOFENAC SODIUM 10 MG/G
2 GEL TOPICAL 3 TIMES DAILY
Qty: 100 G | Refills: 1 | Status: SHIPPED | OUTPATIENT
Start: 2024-02-26

## 2024-02-26 NOTE — PROGRESS NOTES
"Subjective:      Patient ID: Dennis Franklin is a 33 y.o. male.    Chief Complaint: Pain of the Left Hand (Thumb pain )      HPI  Dennis Franklin is a  33 y.o. male presenting today for left hand and thumb pain.  There was not a history of trauma.  Onset of symptoms began several years ago   He is sort of dealt with it over the years   It is usually most notable when he does gripping especially with golfing or gripping a hammer   He localizes the pain to the left thumb no numbness or tingling is reported   .  No popping or locking of the thumb is reported    Review of patient's allergies indicates:  No Known Allergies      Current Outpatient Medications   Medication Sig Dispense Refill    celecoxib (CELEBREX) 200 MG capsule Take 1 capsule (200 mg total) by mouth once daily. 30 capsule 1    COVID-19 AT-HOME TEST Kit FOLLOW INSTRUCTIONS INCLUDED WITH THE PACKAGE.      diclofenac sodium (VOLTAREN) 1 % Gel Apply 2 g topically 3 (three) times daily. 100 g 1    neomycin-polymyxin-hydrocortisone (CORTISPORIN) 3.5-10,000-1 mg/mL-unit/mL-% otic suspension Place 2 drops into the right ear 2 (two) times daily. Apply to the affected toe, not the ear. 10 mL 1    omeprazole (PRILOSEC) 40 MG capsule Take 40 mg by mouth 2 (two) times daily.       No current facility-administered medications for this visit.       No past medical history on file.    No past surgical history on file.    Review of Systems:  ROS    OBJECTIVE:     PHYSICAL EXAM:  Height: 5' 7" (170.2 cm) Weight: 70.3 kg (155 lb)  Vitals:    02/26/24 1408   Weight: 70.3 kg (155 lb)   Height: 5' 7" (1.702 m)   PainSc:   7     Well developed, well nourished male in no acute distress  Alert and oriented x 3  HEENT- Normal exam  Lungs- Clear to auscultation  Heart- Regular rate and rhythm  Abdomen- Soft nontender  Extremity exam- examination left thumb there is no swelling   There is some tenderness mainly volarly over the MP joint and flexor tendon but also little tenderness dorsally " over the extensor tendon   The CMC joint is not tender but he does have some mild instability to the CMC joint   The MP joint is stable and there is no tenderness over the UCL   Range of motion thumb is full no triggering  Sensation intact   Finkelstein test negative   Tinel sign negative    RADIOGRAPHS:  AP lateral x-ray left hand and thumb show no significant abnormalities  Comments: I have personally reviewed the imaging and I agree with the above radiologist's report.    ASSESSMENT/PLAN:     IMPRESSION:  Tendinitis left thumb flexor versus extensor    PLAN:  I explained the nature of the problem to the patient   I have started him on Celebrex 200 mg once a day with food and recommended Voltaren gel topical as well   I have ordered some physical therapy for his left hand and wrist especially since he has had this for so many years I would like to tighten up some of the muscles up particularly the thenar muscles and see if strengthening helps   I have given him a splint for part-time use mainly for lifting and gripping  Follow-up 4-6 weeks       - We talked at length about the anatomy and pathophysiology of   Encounter Diagnosis   Name Primary?    Thumb tendonitis Yes           Disclaimer: This note has been generated using voice-recognition software. There may be typographical errors that have been missed during proof-reading.

## 2024-03-04 NOTE — PROGRESS NOTES
[facility]  Occupational Therapy Initial Evaluation     Name: Dennis Franklin  [facility] Number: [id number]    Therapy Diagnosis:   Encounter Diagnoses   Name Primary?    Thumb tendonitis     Pain of left thumb Yes    Muscle weakness      Physician: Syd Hrenandez Jr., *    Physician Orders: Eval and Treat  Medical Diagnosis: M77.8 (ICD-10-CM) - Thumb tendonitis  Surgical Procedure and Date: n/a / Date of Injury: onset began several years ago  Evaluation Date: 3/5/2024  Insurance Authorization Period Expiration: 02/25/2025  Plan of Care Certification Period: 5/17/2024  Date of Return to MD: 4/8/2024  Visit # / Visits authorized: 1 / 1  FOTO: 1/3    Precautions:  Standard    Time In: 8:30 am  Time Out: 9:05 am  Total Appointment Time (timed & untimed codes): 35 minutes    Subjective     Date of Onset: 1+ years     History of Current Condition/Mechanism of Injury: Pain in the thumb because a little over a year ago and has gotten progressively worse. The pain is volar/radial in nature and is increased with gripping a golf club and swinging a hammer. He doesn't have pain with lifting wrists. No clicking, numbness or tingling noted. He is double jointed at both thumbs and has some laxity in these joints.     Involved Side: left  Dominant Side: Right    Surgical Procedure: n/a  Imaging: FINDINGS:  No fracture or dislocation.  No periarticular osteopenia or erosion.  Cartilage spaces are maintained.  Soft tissues are unremarkable.     Prior Therapy: n/a    Pain:  Functional Pain Scale Rating 0-10:   2/10 on average  2/10 at best  6/10 at worst    Location: left hand/thumb  Description: Aching and Tight  Aggravating Factors: gripping, activity  Easing Factors: rest    Occupation:    Working presently: employed -   Duties: Climbing ladders, some lifting, B/IADLs    Functional Limitations/Social History:    Previous functional status includes: Independent with all ADLs.     Current Functional  Status   Home/Living environment: lives with their family      Limitation of Functional Status as follows:   ADLs/IADLs:     - Feeding: ind    - Bathing: ind    - Dressing/Grooming: ind    - Home Management: ind    - Driving: ind     Leisure: Golfing    Patient's Goals for Therapy: increase strength and decrease pain    Past Medical History/Physical Systems Review:   Dennis Franklin  has no past medical history on file.    Dennis Franklin  has no past surgical history on file.    Dennis has a current medication list which includes the following prescription(s): celecoxib, covid-19 at-home test, diclofenac sodium, neomycin-polymyxin-hydrocortisone, and omeprazole.    Review of patient's allergies indicates:  No Known Allergies     Objective     Observation/Appearance: L thumb ROM full, he does have a little bit of laxity in the L hand and is double jointed    Special Tests: n/a    Edema. Measured in centimeters.  WNL      Elbow and Wrist ROM. Measured in degrees.  Elbow and wrist ROM full    Hand ROM. Measured in degrees.   3/5/2024 3/5/2024      Left Right            Index: MP                   PIP                      DIP              Long:  MP                  PIP                  DIP              Ring:   MP                  PIP                  DIP              Small:  MP                   PIP                   DIP              Thumb: MP WNL WNL                  IP           Rad ABD           Pal ABD              Opposition          Strength (Dynamometer) and Pinch Strength (Pinch Gauge)  Measured in pounds.   3/5/2024 3/5/2024      Left Right     Rung II 80# 100#      Castano Pinch 20# 20#     3pt Pinch 20# 21#       Sensation: not formally assessed   None noted      CMS Impairment/Limitation/Restriction for FOTO hand Survey    Therapist reviewed FOTO scores for Dennis Franklin on 3/5/2024.   FOTO documents entered into [employer] - see Media section.    Limitation Score: tbd%         Treatment     Total Treatment time (time-based codes)  separate from Evaluation: 20 minutes    Alex received the following supervised modalities after being cleared for contradictions for 5 minutes:   -Patient tolerates MHP x 5 min in order to decrease pain and increase soft tissue extensibility in preparation for ROM, concurrent with assessment of deficits      Alex received therapeutic exercises for 15 minutes including:  - HEP review  - thumb ROM and blocking    - wrist ROM  - pink tputty    Patient Education and Home Exercises      Education provided:   -role of OT, goals for OT, scheduling/cancellations, insurance limitations with patient.  -Additional Education provided:   - HEP in place    Written Home Exercises Provided: yes.  Exercises were reviewed and Alex was able to demonstrate them prior to the end of the session.    Alex demonstrated good  understanding of the education provided.     Pt was advised to perform these exercises free of pain, and to stop performing them if pain occurs.    See EMR under Patient Instructions for exercises provided 3/5/2024.    Assessment     Dennis Franklin is a 33 y.o. male referred to outpatient occupational therapy and presents with a medical diagnosis of M77.8 (ICD-10-CM) - Thumb tendonitis.  This is exacerbated with gripping a golf club and swinging a hammer. He has a neoprene thumb brace which he wears sometimes, but it doesn't really help with him playing golf. We discuss stabilization techniques of the thumb as well as Ktape to use to help stabilize the thumb when performing desired activities. He was not issued a FOTO at today's initial evaluation, so one should be given at next f/u. We plan on attending a few visits and depending on improvement, we will maybe send back to orthopedist for a CSI or other intervention.   Upon assessment, deficits include: pain and weakness of the L hand/thumb  Patient also reports functional difficulty with: gripping, swinging a golf club  I initiate HEP per protocol including: TGEs,  wrist ROM, thumb ROM, tputty strengthening. Patient received written handouts and demos indep with HEP. Skilled education provided to patient as indicated. Continue to progress per protocol as tolerated until patient meets LTGs.      Assessment of Occupational Performance   Performance Deficits    Physical:  Joint Mobility  Joint Stability  Muscle Power/Strength  Muscle Endurance  Control of Voluntary Movement   Strength  Pinch Strength  Gross Motor Coordination  Fine Motor Coordination  Pain    Cognitive:  No Deficits    Psychosocial:    No Deficits     Following medical record review it is determined that pt will benefit from occupational therapy services in order to maximize pain free and/or functional use of left hand. The following goals were discussed with the patient and patient is in agreement with them as to be addressed in the treatment plan. The patient's rehab potential is Good.     Anticipated barriers to occupational therapy: n/a    Plan of care discussed with patient: Yes  Patient's spiritual, cultural and educational needs considered and patient is agreeable to the plan of care and goals as stated below:     Medical Necessity is demonstrated by the following  Occupational Profile/History  Co-morbidities and personal factors that may impact the plan of care [x] LOW: Brief chart review  [] MODERATE: Expanded chart review   [] HIGH: Extensive chart review    Moderate / High Support Documentation: n/a     Examination  Performance deficits relating to physical, cognitive or psychosocial skills that result in activity limitations and/or participation restrictions  [x] LOW: addressing 1-3 Performance deficits  [] MODERATE: 3-5 Performance deficits  [] HIGH: 5+ Performance deficits (please support below)    Moderate / High Support Documentation: n/a     Treatment Options [x] LOW: Limited options  [] MODERATE: Several options  [] HIGH: Multiple options      Decision Making/ Complexity Score: low       The  following goals were discussed with the patient and patient is in agreement with them as to be addressed in the treatment plan.     Goals:   The following goals were discussed with the patient and patient is in agreement with them as to be addressed in the treatment plan.  Long Term Goals:  Goals to be met by discharge:  1) Independent with HEP   2) Pt will decrease pain to trace or none while completing light home management tasks or work related tasks by d/c.   3) Patient will be able to name and apply 2 joint protection techniques      Plan     Certification Period/Plan of care expiration: 3/5/2024 to 5/17/2024.    Outpatient Occupational Therapy 1 times weekly for 10 weeks to include the following interventions: Paraffin, Fluidotherapy, Manual therapy/joint mobilizations, Modalities for pain management, US 3 mhz, Therapeutic exercises/activities., Iontophoresis with 2.0 cc Dexamethasone, Strengthening, Orthotic Fabrication/Fit/Training, Edema Control, Scar Management, Wound Care, Electrical Modalities, Joint Protection, and Energy Conservation.    Tamica Rodriguez, OT

## 2024-03-05 ENCOUNTER — CLINICAL SUPPORT (OUTPATIENT)
Dept: REHABILITATION | Facility: HOSPITAL | Age: 34
End: 2024-03-05
Attending: ORTHOPAEDIC SURGERY
Payer: COMMERCIAL

## 2024-03-05 DIAGNOSIS — M79.645 PAIN OF LEFT THUMB: Primary | ICD-10-CM

## 2024-03-05 DIAGNOSIS — M62.81 MUSCLE WEAKNESS: ICD-10-CM

## 2024-03-05 DIAGNOSIS — M77.8 THUMB TENDONITIS: ICD-10-CM

## 2024-03-05 PROCEDURE — 97165 OT EVAL LOW COMPLEX 30 MIN: CPT | Mod: PN

## 2024-03-05 PROCEDURE — 97110 THERAPEUTIC EXERCISES: CPT | Mod: PN

## 2024-03-05 NOTE — PLAN OF CARE
[facility] OUTPATIENT THERAPY AND WELLNESS  Occupational Therapy Initial Evaluation     Name: Dennis Franklin  [facility] Number: [id number]    Therapy Diagnosis:   Encounter Diagnoses   Name Primary?    Thumb tendonitis     Pain of left thumb Yes    Muscle weakness      Physician: Syd Hernandez Jr., *    Physician Orders: Eval and Treat  Medical Diagnosis: M77.8 (ICD-10-CM) - Thumb tendonitis  Surgical Procedure and Date: n/a / Date of Injury: onset began several years ago  Evaluation Date: 3/5/2024  Insurance Authorization Period Expiration: 02/25/2025  Plan of Care Certification Period: 5/17/2024  Date of Return to MD: 4/8/2024  Visit # / Visits authorized: 1 / 1  FOTO: 1/3    Precautions:  Standard    Time In: 8:30 am  Time Out: 9:05 am  Total Appointment Time (timed & untimed codes): 35 minutes    Subjective     Date of Onset: 1+ years     History of Current Condition/Mechanism of Injury: Pain in the thumb because a little over a year ago and has gotten progressively worse. The pain is volar/radial in nature and is increased with gripping a golf club and swinging a hammer. He doesn't have pain with lifting wrists. No clicking, numbness or tingling noted. He is double jointed at both thumbs and has some laxity in these joints.     Involved Side: left  Dominant Side: Right    Surgical Procedure: n/a  Imaging: FINDINGS:  No fracture or dislocation.  No periarticular osteopenia or erosion.  Cartilage spaces are maintained.  Soft tissues are unremarkable.     Prior Therapy: n/a    Pain:  Functional Pain Scale Rating 0-10:   2/10 on average  2/10 at best  6/10 at worst    Location: left hand/thumb  Description: Aching and Tight  Aggravating Factors: gripping, activity  Easing Factors: rest    Occupation:    Working presently: employed -   Duties: Climbing ladders, some lifting, B/IADLs    Functional Limitations/Social History:    Previous functional status includes: Independent with all ADLs.     Current Functional  Status   Home/Living environment: lives with their family      Limitation of Functional Status as follows:   ADLs/IADLs:     - Feeding: ind    - Bathing: ind    - Dressing/Grooming: ind    - Home Management: ind    - Driving: ind     Leisure: Golfing    Patient's Goals for Therapy: increase strength and decrease pain    Past Medical History/Physical Systems Review:   Dennis Franklin  has no past medical history on file.    Dennis Franklin  has no past surgical history on file.    Dennis has a current medication list which includes the following prescription(s): celecoxib, covid-19 at-home test, diclofenac sodium, neomycin-polymyxin-hydrocortisone, and omeprazole.    Review of patient's allergies indicates:  No Known Allergies     Objective     Observation/Appearance: L thumb ROM full, he does have a little bit of laxity in the L hand and is double jointed    Special Tests: n/a    Edema. Measured in centimeters.  WNL      Elbow and Wrist ROM. Measured in degrees.  Elbow and wrist ROM full    Hand ROM. Measured in degrees.   3/5/2024 3/5/2024      Left Right            Index: MP                   PIP                      DIP              Long:  MP                  PIP                  DIP              Ring:   MP                  PIP                  DIP              Small:  MP                   PIP                   DIP              Thumb: MP WNL WNL                  IP           Rad ABD           Pal ABD              Opposition          Strength (Dynamometer) and Pinch Strength (Pinch Gauge)  Measured in pounds.   3/5/2024 3/5/2024      Left Right     Rung II 80# 100#      Castnao Pinch 20# 20#     3pt Pinch 20# 21#       Sensation: not formally assessed   None noted      CMS Impairment/Limitation/Restriction for FOTO hand Survey    Therapist reviewed FOTO scores for Dennis Franklin on 3/5/2024.   FOTO documents entered into [employer] - see Media section.    Limitation Score: tbd%         Treatment     Total Treatment time (time-based codes)  separate from Evaluation: 20 minutes    Alex received the following supervised modalities after being cleared for contradictions for 5 minutes:   -Patient tolerates MHP x 5 min in order to decrease pain and increase soft tissue extensibility in preparation for ROM, concurrent with assessment of deficits      Alex received therapeutic exercises for 15 minutes including:  - HEP review  - thumb ROM and blocking    - wrist ROM  - pink tputty    Patient Education and Home Exercises      Education provided:   -role of OT, goals for OT, scheduling/cancellations, insurance limitations with patient.  -Additional Education provided:   - HEP in place    Written Home Exercises Provided: yes.  Exercises were reviewed and Alex was able to demonstrate them prior to the end of the session.    Alex demonstrated good  understanding of the education provided.     Pt was advised to perform these exercises free of pain, and to stop performing them if pain occurs.    See EMR under Patient Instructions for exercises provided 3/5/2024.    Assessment     Dennis Franklin is a 33 y.o. male referred to outpatient occupational therapy and presents with a medical diagnosis of M77.8 (ICD-10-CM) - Thumb tendonitis.  This is exacerbated with gripping a golf club and swinging a hammer. He has a neoprene thumb brace which he wears sometimes, but it doesn't really help with him playing golf. We discuss stabilization techniques of the thumb as well as Ktape to use to help stabilize the thumb when performing desired activities. He was not issued a FOTO at today's initial evaluation, so one should be given at next f/u. We plan on attending a few visits and depending on improvement, we will maybe send back to orthopedist for a CSI or other intervention.   Upon assessment, deficits include: pain and weakness of the L hand/thumb  Patient also reports functional difficulty with: gripping, swinging a golf club  I initiate HEP per protocol including: TGEs,  wrist ROM, thumb ROM, tputty strengthening. Patient received written handouts and demos indep with HEP. Skilled education provided to patient as indicated. Continue to progress per protocol as tolerated until patient meets LTGs.      Assessment of Occupational Performance   Performance Deficits    Physical:  Joint Mobility  Joint Stability  Muscle Power/Strength  Muscle Endurance  Control of Voluntary Movement   Strength  Pinch Strength  Gross Motor Coordination  Fine Motor Coordination  Pain    Cognitive:  No Deficits    Psychosocial:    No Deficits     Following medical record review it is determined that pt will benefit from occupational therapy services in order to maximize pain free and/or functional use of left hand. The following goals were discussed with the patient and patient is in agreement with them as to be addressed in the treatment plan. The patient's rehab potential is Good.     Anticipated barriers to occupational therapy: n/a    Plan of care discussed with patient: Yes  Patient's spiritual, cultural and educational needs considered and patient is agreeable to the plan of care and goals as stated below:     Medical Necessity is demonstrated by the following  Occupational Profile/History  Co-morbidities and personal factors that may impact the plan of care [x] LOW: Brief chart review  [] MODERATE: Expanded chart review   [] HIGH: Extensive chart review    Moderate / High Support Documentation: n/a     Examination  Performance deficits relating to physical, cognitive or psychosocial skills that result in activity limitations and/or participation restrictions  [x] LOW: addressing 1-3 Performance deficits  [] MODERATE: 3-5 Performance deficits  [] HIGH: 5+ Performance deficits (please support below)    Moderate / High Support Documentation: n/a     Treatment Options [x] LOW: Limited options  [] MODERATE: Several options  [] HIGH: Multiple options      Decision Making/ Complexity Score: low       The  following goals were discussed with the patient and patient is in agreement with them as to be addressed in the treatment plan.     Goals:   The following goals were discussed with the patient and patient is in agreement with them as to be addressed in the treatment plan.  Long Term Goals:  Goals to be met by discharge:  1) Independent with HEP   2) Pt will decrease pain to trace or none while completing light home management tasks or work related tasks by d/c.   3) Patient will be able to name and apply 2 joint protection techniques      Plan     Certification Period/Plan of care expiration: 3/5/2024 to 5/17/2024.    Outpatient Occupational Therapy 1 times weekly for 10 weeks to include the following interventions: Paraffin, Fluidotherapy, Manual therapy/joint mobilizations, Modalities for pain management, US 3 mhz, Therapeutic exercises/activities., Iontophoresis with 2.0 cc Dexamethasone, Strengthening, Orthotic Fabrication/Fit/Training, Edema Control, Scar Management, Wound Care, Electrical Modalities, Joint Protection, and Energy Conservation.    Tamica Rodriguez, OT

## 2024-03-05 NOTE — PATIENT INSTRUCTIONS
[facility]  OCCUPATIONAL THERAPY  HOME EXERCISE PROGRAM     Complete the following massages for 5 min, 2x/day.             Edema massage  Scar massage    Complete the following exercises with 20 repetitions each, 3x/day.                                                             BONITA Cherry

## 2024-03-14 ENCOUNTER — OFFICE VISIT (OUTPATIENT)
Dept: PODIATRY | Facility: CLINIC | Age: 34
End: 2024-03-14
Payer: COMMERCIAL

## 2024-03-14 VITALS
HEIGHT: 67 IN | WEIGHT: 155 LBS | HEART RATE: 71 BPM | DIASTOLIC BLOOD PRESSURE: 76 MMHG | BODY MASS INDEX: 24.33 KG/M2 | SYSTOLIC BLOOD PRESSURE: 128 MMHG

## 2024-03-14 DIAGNOSIS — M79.674 TOE PAIN, RIGHT: ICD-10-CM

## 2024-03-14 DIAGNOSIS — L60.0 INGROWN NAIL: Primary | ICD-10-CM

## 2024-03-14 PROCEDURE — 99499 UNLISTED E&M SERVICE: CPT | Mod: S$GLB,,, | Performed by: PODIATRIST

## 2024-03-14 PROCEDURE — 11750 EXCISION NAIL&NAIL MATRIX: CPT | Mod: T5,S$GLB,, | Performed by: PODIATRIST

## 2024-03-14 PROCEDURE — 99999 PR PBB SHADOW E&M-EST. PATIENT-LVL III: CPT | Mod: PBBFAC,,, | Performed by: PODIATRIST

## 2024-03-14 NOTE — PROGRESS NOTES
"  CHIEF CONCERN: Ingrown Toenail     HPI:    Dennis Franklin is a 33 y.o. male with concerns of painful toenail on the right hallux, lateral border.  Had chemical matrixectomy in the past.  Redness and swollen skin noted to the edge .  Tried to trim at home.  Noted pain exacerbated with work boots.         Patient Active Problem List   Diagnosis    Thumb tendonitis    Pain of left thumb    Muscle weakness           Current Outpatient Medications on File Prior to Visit   Medication Sig Dispense Refill    celecoxib (CELEBREX) 200 MG capsule Take 1 capsule (200 mg total) by mouth once daily. 30 capsule 1    diclofenac sodium (VOLTAREN) 1 % Gel Apply 2 g topically 3 (three) times daily. 100 g 1    COVID-19 AT-HOME TEST Kit FOLLOW INSTRUCTIONS INCLUDED WITH THE PACKAGE.      neomycin-polymyxin-hydrocortisone (CORTISPORIN) 3.5-10,000-1 mg/mL-unit/mL-% otic suspension Place 2 drops into the right ear 2 (two) times daily. Apply to the affected toe, not the ear. 10 mL 1    omeprazole (PRILOSEC) 40 MG capsule Take 40 mg by mouth 2 (two) times daily.       No current facility-administered medications on file prior to visit.            Review of patient's allergies indicates:  No Known Allergies        ROS:  General ROS: negative for chills, fatigue or fever  Cardiovascular ROS: no chest pain or dyspnea on exertion  Musculoskeletal ROS: negative for - joint pain or joint stiffness.  Negative for loss of strength  Neuro ROS: Negative for syncope, numbness, or muscle weakness  Skin ROS: Negative for rash, itching or hair changes.  +Toenail changes        EXAM:   Vitals:    03/14/24 1326   BP: 128/76   Pulse: 71   Weight: 70.3 kg (154 lb 15.7 oz)   Height: 5' 7" (1.702 m)       LOWER EXTREMITY EXAM:    Vascular:    Dorsalis pedis and posterior tibial pulses are palpable. capillary refill time is within normal limits   Toes are warm to touch.    There is  presence of digital hair.    There is  minimal localized edema to the affected toe. "     Neurological:    Light touch, proprioception, and sharp/dull sensation are all intact.   Mulders click:  Absent  Tinels:  Absent.   No LOPS    Dermatological:    Mild erythema noted to the affected area, lateral border.  Scant serous drainage.   Absent paronychia.     Absent abscess      Musculoskeletal:    Muscle strength is 5/5 in all groups .    No swelling/crepitus at the interphalangeal joints.  non palpable pain 1st and 2nd PIPJ right   non palpable pain 1st and 2nd DIPJ right                ASSESSMENT/PLAN     Problem List Items Addressed This Visit    None  Visit Diagnoses       Ingrown nail    -  Primary    Toe pain, right                      I counseled the patient on the patient's  conditions, their implications and medical management.     General nail care measures for abnormal nails include:  Keeping nails trimmed short, but avoid overzealous trimming  Avoiding trauma   Avoiding contact irritants   Keeping nails dry (avoiding wet work)  Avoiding all nail cosmetics  Wearing shoes that fit well at the toe box.  Avoid tight shoes.     chemical matrixectomy today.     Nail Removal    Date/Time: 3/14/2024 1:30 PM    Performed by: Asiya Fitzgerald DPM  Authorized by: Asiya Fitzgerald DPM    Consent Done?:  Yes (Written)  Time out: Immediately prior to the procedure a time out was called    Prep: patient was prepped and draped in usual sterile fashion    Location:     Location:  Right foot    Location detail:  Right big toe  Anesthesia:     Anesthesia:  Local infiltration    Local anesthetic:  Lidocaine 1% without epinephrine and bupivacaine 0.5% without epinephrine    Anesthetic total (ml):  2.5  Procedure Details:     Preparation:  Skin prepped with Betadine    Amount removed:  Partial    Side:  Lateral    Wedge excision of skin of nail fold: No      Nail bed sutured?: No      Nail matrix removed:  Partial (Phenol was used)    Removal method:  Phenol and alcohol    Dressing applied:  Antibiotic ointment and  dressing applied    Patient tolerance:  Patient tolerated the procedure well with no immediate complications

## 2024-03-14 NOTE — PROGRESS NOTES
"  Occupational Therapy Discharge Note     Name: Dennis Franklin  [facility] Number: [id number]    Therapy Diagnosis:   Encounter Diagnoses   Name Primary?    Pain of left thumb Yes    Muscle weakness      Physician: Syd Hernandez Jr., *    Visit Date: 3/15/2024  Physician Orders: Eval and Treat  Medical Diagnosis: M77.8 (ICD-10-CM) - Thumb tendonitis  Surgical Procedure and Date: n/a / Date of Injury: onset began several years ago  Evaluation Date: 3/5/2024  Insurance Authorization Period Expiration: 02/25/2025  Plan of Care Certification Period: 5/17/2024  Date of Return to MD: 4/8/2024  Visit # / Visits authorized: 2 / 20  FOTO: 1/3     Precautions:  Standard    Time In: 9:15 am  Time Out: 10:45 am   Total Billable Time: 30 minutes    Subjective     Pt reports: "Its feeling better than it was last time."  he was compliant with home exercise program given last session.   Response to previous treatment: decrease in pain  Functional change: more aware of stable pinch and  positioning    Pain: 1/10  Location: left wrist     Objective   Observation/Appearance: L thumb ROM full, he does have a little bit of laxity in the L hand and is double jointed     Special Tests: n/a     Edema. Measured in centimeters.  WNL        Elbow and Wrist ROM. Measured in degrees.  Elbow and wrist ROM full     Hand ROM. Measured in degrees.    3/5/2024 3/5/2024         Left Right                   Index: MP                       PIP                          DIP                       Long:  MP                      PIP                      DIP                       Ring:   MP                      PIP                      DIP                       Small:  MP                       PIP                       DIP                       Thumb: MP WNL WNL                    IP               Rad ABD               Pal ABD                  Opposition               Strength (Dynamometer) and Pinch Strength (Pinch Gauge)  Measured in pounds.    3/5/2024 " 3/5/2024  3/15/2024       Left Right  Left     Rung II 80# 100#   80#     Castano Pinch 20# 20#  20#     3pt Pinch 20# 21# 21#        Sensation: not formally assessed   None noted        CMS Impairment/Limitation/Restriction for FOTO hand Survey     Therapist reviewed FOTO scores for Dennis Franklin on 3/5/2024.   FOTO documents entered into [employer] - see Media section.     Limitation Score: 36%            Treatment      Alex received the following supervised modalities after being cleared for contradictions for 10 minutes:   -Patient tolerates MHP x 10 min in order to decrease pain and increase soft tissue extensibility in preparation for ROM, concurrent with assessment of deficits.       Alex received the following manual therapy techniques for 10 minutes:   -I provide gentle STM to L thumb  in order to increase soft tissue extensibility, decrease pain, and tenderness/hypersensitivity in the stated area, and promote scar tissue remodeling.      Alex participated in dynamic functional therapeutic activities to improve functional performance for 20  minutes, including:  - resisted thumb ROM with RB   - thumb stabilization   - C, bunny ears, ok   -1st dorsal stabilization   - pink tputty fxnl thumb strengthening (+HEP)  - blue CP and small poms with stable pinch grasp    Home Exercises and Education Provided     Education provided:   - HEP in place   - Progress towards goals     Written Home Exercises Provided: Patient instructed to cont prior HEP.  Exercises were reviewed and Alex was able to demonstrate them prior to the end of the session.  Alex demonstrated good  understanding of the HEP provided.   .   See EMR under Patient Instructions for exercises provided prior visit.        Assessment     Patient has attended skilled OT for 2 visits. He states that his thumb hasn't been as painful, and he has been more aware of stable thumb/hand positioning with pinching and gripping. He hasn't been swinging the golf club as  much which may contribute to the decreased pain, but I encourage patient to maybe invest in a comfort cool neoprene thumb brace when practicinig golf on his home simulator. Today, we review thumb stabilization activities and strengthening to include with HEP.   Patient demonstrates improvement in the following areas: decrease in overall pain, increased awareness of deficits, limitiations, and stabilization for the L thumb.   Remaining deficits include: minimal pain noted  At this point, patient has met LTGs and reached max potential with skilled OT. I recommend patient transition to an independent HEP. Patient agrees with this plan. I review and discuss discharge HEP and patient demonstrates and verbalizes understanding and independence with all information presented. This is to serve as a formal discharge from skilled OT.     Anticipated barriers to occupational therapy: n/a    Pt's spiritual, cultural and educational needs considered and pt agreeable to plan of care and goals.    Goals:  The following goals were discussed with the patient and patient is in agreement with them as to be addressed in the treatment plan.  Long Term Goals:  Goals to be met by discharge:  1) Independent with HEP  Met, 3/15/2024  2) Pt will decrease pain to trace or none while completing light home management tasks or work related tasks by d/c. Met, 3/15/2024  3) Patient will be able to name and apply 2 joint protection techniques   Met, 3/15/2024    Plan   D/C today    Tamica Rodriguez, OT

## 2024-03-15 ENCOUNTER — CLINICAL SUPPORT (OUTPATIENT)
Dept: REHABILITATION | Facility: HOSPITAL | Age: 34
End: 2024-03-15
Attending: ORTHOPAEDIC SURGERY
Payer: COMMERCIAL

## 2024-03-15 DIAGNOSIS — M62.81 MUSCLE WEAKNESS: ICD-10-CM

## 2024-03-15 DIAGNOSIS — M79.645 PAIN OF LEFT THUMB: Primary | ICD-10-CM

## 2024-03-15 PROCEDURE — 97140 MANUAL THERAPY 1/> REGIONS: CPT | Mod: PN

## 2024-03-15 PROCEDURE — 97110 THERAPEUTIC EXERCISES: CPT | Mod: PN

## 2024-03-27 ENCOUNTER — PATIENT MESSAGE (OUTPATIENT)
Dept: OPTOMETRY | Facility: CLINIC | Age: 34
End: 2024-03-27
Payer: COMMERCIAL

## 2024-03-28 ENCOUNTER — OFFICE VISIT (OUTPATIENT)
Dept: PODIATRY | Facility: CLINIC | Age: 34
End: 2024-03-28
Payer: COMMERCIAL

## 2024-03-28 VITALS
HEIGHT: 67 IN | HEART RATE: 70 BPM | SYSTOLIC BLOOD PRESSURE: 119 MMHG | BODY MASS INDEX: 24.33 KG/M2 | DIASTOLIC BLOOD PRESSURE: 70 MMHG | WEIGHT: 155 LBS

## 2024-03-28 DIAGNOSIS — M79.674 TOE PAIN, RIGHT: ICD-10-CM

## 2024-03-28 DIAGNOSIS — L60.0 INGROWN NAIL: ICD-10-CM

## 2024-03-28 DIAGNOSIS — Z09 FOLLOW-UP EXAM AFTER TREATMENT: Primary | ICD-10-CM

## 2024-03-28 PROCEDURE — 99999 PR PBB SHADOW E&M-EST. PATIENT-LVL III: CPT | Mod: PBBFAC,,, | Performed by: PODIATRIST

## 2024-03-28 PROCEDURE — 99024 POSTOP FOLLOW-UP VISIT: CPT | Mod: S$GLB,,, | Performed by: PODIATRIST

## 2024-03-28 NOTE — PROGRESS NOTES
"  Chief Complaint   Patient presents with    Post Op     Post Op P&A         S:   33 y.o. male returns for follow up s/p ingrown toenail procedure - right hallux.   Patient is healing well, no complaints.  The patient has been keeping the toe covered as instructed.   Patient has no pain today. Patient denies constitutional symptoms.         O:   Vitals:    03/28/24 1242   BP: 119/70   Pulse: 70   Weight: 70.3 kg (154 lb 15.7 oz)   Height: 5' 7" (1.702 m)        S/p  right  hallux ingrown toenail matrixectomy. mild erythema;  no ascending cellulitis.   mild swelling. No drainage.    The site is healing well. No signs of infection.   Pedal pulses are palpable.   Range of motion intact.   no  tenderness to palpation.             A/P:     1. Follow-up exam after treatment        2. Toe pain, right        3. Ingrown nail             S/p right hallux ingrown toenail chemical matrixectomy.  Patient is healing well.      Return to regular activities as tolerated.     Call or return to clinic prn if these symptoms worsen or fail to improve as anticipated.     "

## 2024-05-01 ENCOUNTER — OFFICE VISIT (OUTPATIENT)
Dept: UROLOGY | Facility: CLINIC | Age: 34
End: 2024-05-01
Payer: COMMERCIAL

## 2024-05-01 VITALS
BODY MASS INDEX: 23.46 KG/M2 | SYSTOLIC BLOOD PRESSURE: 116 MMHG | HEIGHT: 67 IN | HEART RATE: 72 BPM | WEIGHT: 149.5 LBS | DIASTOLIC BLOOD PRESSURE: 71 MMHG

## 2024-05-01 DIAGNOSIS — Z30.09 VASECTOMY EVALUATION: Primary | ICD-10-CM

## 2024-05-01 PROCEDURE — 99203 OFFICE O/P NEW LOW 30 MIN: CPT | Mod: S$GLB,,, | Performed by: UROLOGY

## 2024-05-01 PROCEDURE — 99999 PR PBB SHADOW E&M-EST. PATIENT-LVL III: CPT | Mod: PBBFAC,,, | Performed by: UROLOGY

## 2024-05-01 RX ORDER — DIAZEPAM 10 MG/1
10 TABLET ORAL ONCE
Qty: 1 TABLET | Refills: 0 | Status: SHIPPED | OUTPATIENT
Start: 2024-05-01 | End: 2024-05-01

## 2024-05-01 NOTE — PROGRESS NOTES
Subjective:       Patient ID: Dennis Franklin is a 33 y.o. male.    Chief Complaint: vas consult     This is a 33 y.o.  male patient that is new to me.  The patient was self referred for  vasectomy evaluation.    Patient is  for 8 years.  He has 2 children, youngest aged 3 yo.  He denies scrotal discomfort or history of scrotal trauma or surgery.  He denies illicit drug use.  He is not on blood thinners.      ---  No past medical history on file.    No past surgical history on file.    Family History   Problem Relation Name Age of Onset    Diabetes type II Neg Hx      Colon cancer Neg Hx      Prostate cancer Neg Hx         Social History     Tobacco Use    Smoking status: Never    Smokeless tobacco: Never   Substance Use Topics    Alcohol use: No       Current Outpatient Medications on File Prior to Visit   Medication Sig Dispense Refill    diclofenac sodium (VOLTAREN) 1 % Gel Apply 2 g topically 3 (three) times daily. 100 g 1    COVID-19 AT-HOME TEST Kit FOLLOW INSTRUCTIONS INCLUDED WITH THE PACKAGE.      neomycin-polymyxin-hydrocortisone (CORTISPORIN) 3.5-10,000-1 mg/mL-unit/mL-% otic suspension Place 2 drops into the right ear 2 (two) times daily. Apply to the affected toe, not the ear. 10 mL 1    omeprazole (PRILOSEC) 40 MG capsule Take 40 mg by mouth 2 (two) times daily.       No current facility-administered medications on file prior to visit.       Review of patient's allergies indicates:  No Known Allergies    Review of Systems   Constitutional:  Negative for activity change, chills and fever.   HENT:  Negative for congestion.    Respiratory:  Negative for cough, chest tightness and shortness of breath.    Cardiovascular:  Negative for chest pain and palpitations.   Gastrointestinal:  Negative for abdominal distention, abdominal pain, nausea and vomiting.   Genitourinary:  Negative for difficulty urinating, flank pain, hematuria, penile pain, scrotal swelling and testicular pain.   Musculoskeletal:   Negative for gait problem.       Objective:      Physical Exam  HENT:      Head: Atraumatic.   Pulmonary:      Effort: Pulmonary effort is normal.   Genitourinary:     Testes: Normal.   Neurological:      General: No focal deficit present.      Mental Status: He is alert and oriented to person, place, and time.         Assessment:     Problem Noted   Vasectomy Evaluation 5/1/2024         Plan:     Schedule vasectomy    Discussion:  Patient was extensively counseled on the risks, benefits and alternatives of vasectomy.  All questions were answered.  I specifically discussed that vasectomy is meant to be a permanent form of contraception. Following vasectomy, another form of contraception is required until vas occlusion is confirmed by post-vasectomy semen analysis.   The risk of pregnancy post-vasectomy is approximately 1 in 2,000 for men for have post-vasectomy azoospermia.  Repeat vasectomy is necessary in <1% of vasectomies. There is a 1-2% risk of complications such as symptomatic hematoma and infection.  Chronic pain is rare.  After discussion, patient wished to proceed.       Randy Bill MD

## 2024-08-26 ENCOUNTER — OFFICE VISIT (OUTPATIENT)
Dept: PODIATRY | Facility: CLINIC | Age: 34
End: 2024-08-26
Payer: COMMERCIAL

## 2024-08-26 VITALS
DIASTOLIC BLOOD PRESSURE: 74 MMHG | HEART RATE: 74 BPM | SYSTOLIC BLOOD PRESSURE: 138 MMHG | HEIGHT: 67 IN | WEIGHT: 149.5 LBS | BODY MASS INDEX: 23.46 KG/M2

## 2024-08-26 DIAGNOSIS — M21.42 PES PLANUS OF BOTH FEET: ICD-10-CM

## 2024-08-26 DIAGNOSIS — M21.41 PES PLANUS OF BOTH FEET: ICD-10-CM

## 2024-08-26 DIAGNOSIS — M77.50 TENDONITIS OF FOOT: Primary | ICD-10-CM

## 2024-08-26 PROCEDURE — 3008F BODY MASS INDEX DOCD: CPT | Mod: CPTII,S$GLB,, | Performed by: PODIATRIST

## 2024-08-26 PROCEDURE — 1160F RVW MEDS BY RX/DR IN RCRD: CPT | Mod: CPTII,S$GLB,, | Performed by: PODIATRIST

## 2024-08-26 PROCEDURE — 1159F MED LIST DOCD IN RCRD: CPT | Mod: CPTII,S$GLB,, | Performed by: PODIATRIST

## 2024-08-26 PROCEDURE — 99213 OFFICE O/P EST LOW 20 MIN: CPT | Mod: S$GLB,,, | Performed by: PODIATRIST

## 2024-08-26 PROCEDURE — 3075F SYST BP GE 130 - 139MM HG: CPT | Mod: CPTII,S$GLB,, | Performed by: PODIATRIST

## 2024-08-26 PROCEDURE — 3078F DIAST BP <80 MM HG: CPT | Mod: CPTII,S$GLB,, | Performed by: PODIATRIST

## 2024-08-26 PROCEDURE — 99999 PR PBB SHADOW E&M-EST. PATIENT-LVL III: CPT | Mod: PBBFAC,,, | Performed by: PODIATRIST

## 2024-08-26 RX ORDER — DICLOFENAC SODIUM 10 MG/G
2 GEL TOPICAL 4 TIMES DAILY PRN
Qty: 100 G | Refills: 2 | Status: SHIPPED | OUTPATIENT
Start: 2024-08-26

## 2024-08-26 RX ORDER — CEFDINIR 300 MG/1
300 CAPSULE ORAL EVERY 12 HOURS
COMMUNITY
Start: 2024-05-03

## 2024-08-26 RX ORDER — PREDNISONE 20 MG/1
40 TABLET ORAL
COMMUNITY
Start: 2024-05-03

## 2024-08-26 NOTE — PROGRESS NOTES
"Chief Complaint   Patient presents with    Foot Problem     Both feet's tendons feel inflamed, especially after playing sports or working out.           HPI: Dennis Franklin is a 34 y.o. male who presents to clinic with concerns of Foot Problem (Both feet's tendons feel inflamed, especially after playing sports or working out.)  Patient recalls wearing a pair of new shoes, and then playing sand volleyball barefoot.  Pain about a week.  Then stopped wearing those shoes.  Tried Advil for pain but that didn't help.  Pain is worse with weight bearing.       Patient Active Problem List   Diagnosis    Thumb tendonitis    Pain of left thumb    Muscle weakness    Vasectomy evaluation         Current Outpatient Medications on File Prior to Visit   Medication Sig Dispense Refill    cefdinir (OMNICEF) 300 MG capsule Take 300 mg by mouth every 12 (twelve) hours.      predniSONE (DELTASONE) 20 MG tablet Take 40 mg by mouth.      diazePAM (VALIUM) 10 MG Tab Take 1 tablet (10 mg total) by mouth once. Take 20 minutes prior to procedure. for 1 dose 1 tablet 0     No current facility-administered medications on file prior to visit.         Review of patient's allergies indicates:  No Known Allergies        ROS:   General ROS: negative for chills, fatigue or fever  Cardiovascular ROS: no chest pain or dyspnea on exertion  Musculoskeletal ROS: negative  for  joint pain or joint stiffness.  Negative for loss of strength  Neuro ROS: Negative for syncope, numbness, or muscle weakness  Skin ROS: Negative for rash, itching or hair changes.          EXAM:    Vitals:    08/26/24 1319   BP: 138/74   Pulse: 74   Weight: 67.8 kg (149 lb 7.6 oz)   Height: 5' 7" (1.702 m)        General: Patient is well-developed, well-nourished.  Alert and oriented x 3 and in no acute distress.     Lower extremity Physical exam:    Vasc:   Palpable pedal pulses with feet appropriately warm to touch.    Capillary refill time within normal limits.  Edema is absent. "     Neuro:   Epicritic sensation intact.    No focal deficits.   No Tinels with nerve percussion.  Negative Mulders    Derm:   No wounds, macerations, or rashes.    No bruising.   No erythema noted.     Msk:    Muscle strength is 5/5 in all groups bilaterally.  Metatarsophalangeal, subtalar, and ankle range of motion are within normal limits without crepitus bilaterally.    There is no evidence of a bunion  no  hammertoes.  There is no limitation of dorsiflexion with knees extended Bilaterally.  No bruising or redness.  Mild pes planus bilateral        MEDICAL DECISION MAKING:     Problem List Items Addressed This Visit    None  Visit Diagnoses       Tendonitis of foot    -  Primary    Relevant Medications    diclofenac sodium (VOLTAREN) 1 % Gel    Other Relevant Orders    ORTHOTIC DEVICE (DME)    Pes planus of both feet        Relevant Medications    diclofenac sodium (VOLTAREN) 1 % Gel    Other Relevant Orders    ORTHOTIC DEVICE (DME)          I counseled the patient on the patient's conditions, their implications and medical management.  May consider over the counter arch supports, ex.  Spenco brand.     Consider custom foot orthotics.  A prescription for custom foot orthotics was provided along with list of vendors/orthotists.   Shoe and activity modification as needed for relief.   Avoid barefoot walking.  Avoid flats.   Prescription  Meds as ordered above.   Call or return to clinic prn if these symptoms worsen or fail to improve as anticipated.      Negative